# Patient Record
Sex: FEMALE | Race: WHITE | Employment: PART TIME | URBAN - METROPOLITAN AREA
[De-identification: names, ages, dates, MRNs, and addresses within clinical notes are randomized per-mention and may not be internally consistent; named-entity substitution may affect disease eponyms.]

---

## 2017-01-03 ENCOUNTER — HOSPITAL ENCOUNTER (OUTPATIENT)
Dept: CT IMAGING | Age: 57
Discharge: HOME | End: 2017-01-03
Attending: INTERNAL MEDICINE
Payer: SELF-PAY

## 2017-01-03 DIAGNOSIS — E78.00 HYPERCHOLESTEREMIA: ICD-10-CM

## 2017-01-03 PROCEDURE — 75571 CT HRT W/O DYE W/CA TEST: CPT

## 2017-01-03 NOTE — CARDIO/PULMONARY
Cardiac Rehab: Attempted to contact pt about calcium scoring results. Left message on home & cell phone voice mails. Encouraged pt to check results on My Chart or to contact PCP office. Sent copy of report to pt.

## 2017-03-17 ENCOUNTER — TELEPHONE (OUTPATIENT)
Dept: INTERNAL MEDICINE CLINIC | Age: 57
End: 2017-03-17

## 2017-03-17 DIAGNOSIS — F32.A DEPRESSION, UNSPECIFIED DEPRESSION TYPE: ICD-10-CM

## 2017-03-17 NOTE — TELEPHONE ENCOUNTER
----- Message from Lupe Calero sent at 3/17/2017 12:55 PM EDT -----  Regarding: Dr Meyers/rx refill  Pt (p) [phone], pt will  Run out of her medication before her scheduled  CPE on 4/11/17, and she would like to know if she can have her rx  For   Lipitor and her Wellbutrin called into her  [facility] [address] the same one listed in her chart, to hold her until then, or can she be scheduled in sometime next week for her med check?

## 2017-03-20 RX ORDER — BUPROPION HYDROCHLORIDE 100 MG/1
TABLET, EXTENDED RELEASE ORAL
Qty: 30 TAB | Refills: 0 | Status: SHIPPED | OUTPATIENT
Start: 2017-03-20 | End: 2017-04-11 | Stop reason: DRUGHIGH

## 2017-03-20 RX ORDER — BUPROPION HYDROCHLORIDE 300 MG/1
TABLET ORAL
Qty: 30 TAB | Refills: 0 | Status: SHIPPED | OUTPATIENT
Start: 2017-03-20 | End: 2017-04-11 | Stop reason: SDUPTHER

## 2017-03-20 NOTE — TELEPHONE ENCOUNTER
Notified patient we sent a 30 day supply of her wellbutrin to her pharmacy on file. Advised she needs to check with her pharmacy on the Lipitor as this was sent in December for 90 day plus 2 additional refills. Patient voiced understanding.

## 2017-04-11 ENCOUNTER — OFFICE VISIT (OUTPATIENT)
Dept: INTERNAL MEDICINE CLINIC | Age: 57
End: 2017-04-11

## 2017-04-11 ENCOUNTER — HOSPITAL ENCOUNTER (OUTPATIENT)
Dept: GENERAL RADIOLOGY | Age: 57
Discharge: HOME | End: 2017-04-11
Attending: INTERNAL MEDICINE
Payer: COMMERCIAL

## 2017-04-11 VITALS
DIASTOLIC BLOOD PRESSURE: 80 MMHG | RESPIRATION RATE: 16 BRPM | WEIGHT: 128 LBS | HEART RATE: 81 BPM | SYSTOLIC BLOOD PRESSURE: 118 MMHG | HEIGHT: 62 IN | OXYGEN SATURATION: 99 % | TEMPERATURE: 98.8 F | BODY MASS INDEX: 23.55 KG/M2

## 2017-04-11 DIAGNOSIS — M54.2 NECK PAIN: ICD-10-CM

## 2017-04-11 DIAGNOSIS — D12.4 ADENOMATOUS POLYP OF DESCENDING COLON: ICD-10-CM

## 2017-04-11 DIAGNOSIS — Z01.419 WELL WOMAN EXAM WITH ROUTINE GYNECOLOGICAL EXAM: Primary | ICD-10-CM

## 2017-04-11 DIAGNOSIS — Z23 ENCOUNTER FOR IMMUNIZATION: ICD-10-CM

## 2017-04-11 DIAGNOSIS — E78.00 HYPERCHOLESTEREMIA: ICD-10-CM

## 2017-04-11 DIAGNOSIS — F32.A DEPRESSION, UNSPECIFIED DEPRESSION TYPE: ICD-10-CM

## 2017-04-11 PROCEDURE — 72050 X-RAY EXAM NECK SPINE 4/5VWS: CPT

## 2017-04-11 RX ORDER — ATORVASTATIN CALCIUM 40 MG/1
40 TABLET, FILM COATED ORAL DAILY
Qty: 90 TAB | Refills: 3 | Status: SHIPPED | OUTPATIENT
Start: 2017-04-11 | End: 2019-02-12 | Stop reason: SDUPTHER

## 2017-04-11 RX ORDER — BUPROPION HYDROCHLORIDE 300 MG/1
TABLET ORAL
Qty: 90 TAB | Refills: 1 | Status: SHIPPED | OUTPATIENT
Start: 2017-04-11 | End: 2017-08-28 | Stop reason: SDUPTHER

## 2017-04-11 NOTE — MR AVS SNAPSHOT
Visit Information Date & Time Provider Department Dept. Phone Encounter #  
 4/11/2017  8:30 AM Hawa Olivier MD [facility] [address] [id number] Upcoming Health Maintenance Date Due Hepatitis C Screening 1960 DTaP/Tdap/Td series (1 - Tdap) 1/2/2005 BREAST CANCER SCRN MAMMOGRAM 1/14/2017 PAP AKA CERVICAL CYTOLOGY 1/14/2018 COLONOSCOPY 8/17/2022 Allergies as of 4/11/2017  Review Complete On: 4/11/2017 By: Hawa Olivier MD  
 No Known Allergies Current Immunizations  Reviewed on 4/11/2017 Name Date Hepatitis A Vaccine 3/1/2007 Hepatitis B Vaccine 2/4/2009, 3/1/2007 Influenza Vaccine 10/30/2013 Influenza Vaccine (Quad) PF 11/14/2016 Influenza Vaccine Split 10/31/2012 11:51 AM, 10/7/2010  4:02 PM  
 Influenza Vaccine Whole 10/1/2008 Pneumococcal Vaccine (Unspecified Type) 10/1/2007 TD Vaccine 1/1/2005 Tdap  Incomplete Reviewed by Hawa Olivier MD on 4/11/2017 at  8:53 AM  
You Were Diagnosed With   
  
 Codes Comments Well woman exam with routine gynecological exam    -  Primary ICD-10-CM: L88.119 ICD-9-CM: V72.31 Adenomatous polyp of descending colon     ICD-10-CM: D12.4 ICD-9-CM: 211.3 Hypercholesteremia     ICD-10-CM: E78.00 ICD-9-CM: 272.0 Encounter for immunization     ICD-10-CM: I49 ICD-9-CM: V03.89 Depression, unspecified depression type     ICD-10-CM: F32.9 ICD-9-CM: 127 Vitals BP Pulse Temp Resp Height(growth percentile) Weight(growth percentile) 118/80 (BP 1 Location: Left arm, BP Patient Position: Sitting) 81 98.8 °F (37.1 °C) (Oral) 16 5' 1.5\" (1.562 m) 128 lb (58.1 kg) LMP SpO2 BMI OB Status Smoking Status 10/30/2011 99% 23.79 kg/m2 Postmenopausal Never Smoker Vitals History BMI and BSA Data Body Mass Index Body Surface Area  
 23.79 kg/m 2 1.59 m 2 Preferred Pharmacy Pharmacy Name Phone [address] [phone] Your Updated Medication List  
  
   
This list is accurate as of: 4/11/17  9:17 AM.  Always use your most recent med list.  
  
  
  
  
 atorvastatin 40 mg tablet Commonly known as:  LIPITOR Take 1 Tab by mouth daily. buPROPion  mg XL tablet Commonly known as:  WELLBUTRIN XL  
TAKE ONE TABLET BY MOUTH EVERY MORNING  
  
 clonazePAM 0.5 mg tablet Commonly known as:  KlonoPIN  
TAKE ONE TO TWO TABLETS BY MOUTH EVERY EVENING  
  
 CO Q-10 PO Take  by mouth. MULTIPLE VITAMINS PO Take  by mouth. olopatadine 0.1 % ophthalmic solution Commonly known as:  PATANOL Administer 2 Drops to both eyes two (2) times a day. zolpidem 10 mg tablet Commonly known as:  AMBIEN  
TAKE ONE TABLET BY MOUTH NIGHTLY AS NEEDED FOR SLEEP ZyrTEC 10 mg tablet Generic drug:  cetirizine  
take 10 mg by mouth daily. Prescriptions Printed Refills  
 atorvastatin (LIPITOR) 40 mg tablet 3 Sig: Take 1 Tab by mouth daily. Class: Print Route: Oral  
  
Prescriptions Sent to Pharmacy Refills buPROPion XL (WELLBUTRIN XL) 300 mg XL tablet 1 Sig: TAKE ONE TABLET BY MOUTH EVERY MORNING Class: Normal  
 Pharmacy: [address] #: [phone] We Performed the Following HEPATITIS C AB [09900 CPT(R)] TETANUS, DIPHTHERIA TOXOIDS AND ACELLULAR PERTUSSIS VACCINE (TDAP), IN INDIVIDS. >=7, IM [id number] CPT(R)] VITAMIN D, 25 HYDROXY [id number] CPT(R)] To-Do List   
 04/11/2017 Lab:  HIV 1/2 AB SCREEN W RFLX CONFIRM Introducing [facility]! Dear Nadya : Thank you for requesting a [employer] account. Our records indicate that you already have an active [employer] account. You can access your account anytime at [email or web address] Did you know that you can access your hospital and ER discharge instructions at any time in [email or web address]? You can also review all of your test results from your hospital stay or ER visit. Additional Information If you have questions, please visit the Frequently Asked Questions section of the [email or web address] website at [email or web address] Remember, [email or web address] is NOT to be used for urgent needs. For medical emergencies, dial 911. Now available from your iPhone and Android! Please provide this summary of care documentation to your next provider. Your primary care clinician is listed as Raya 68. If you have any questions after today's visit, please call [phone].

## 2017-04-12 NOTE — PROGRESS NOTES
Subjective:   55 y.o. female for [employer] Woman Check. Patient's last menstrual period was 10/30/2011. Social History: not sexually active. Pertinent past medical hstory: due for colonoscopy 7/17-.she is aware  Lost weight in last 3 mo intentionally and feels great-no gerd    Patient Active Problem List    Diagnosis Date Noted    Colon polyp 12/21/2016    Hypercholesteremia 03/17/2010    Depressive disorder, not elsewhere classified 03/18/2009    Low back pain 01/15/2009     Current Outpatient Prescriptions   Medication Sig Dispense Refill    atorvastatin (LIPITOR) 40 mg tablet Take 1 Tab by mouth daily. 90 Tab 3    buPROPion XL (WELLBUTRIN XL) 300 mg XL tablet TAKE ONE TABLET BY MOUTH EVERY MORNING 90 Tab 1    clonazePAM (KLONOPIN) 0.5 mg tablet TAKE ONE TO TWO TABLETS BY MOUTH EVERY EVENING 60 Tab 0    zolpidem (AMBIEN) 10 mg tablet TAKE ONE TABLET BY MOUTH NIGHTLY AS NEEDED FOR SLEEP 20 Tab 1    olopatadine (PATANOL) 0.1 % ophthalmic solution Administer 2 Drops to both eyes two (2) times a day. 15 mL 1    UBIDECARENONE (CO Q-10 PO) Take  by mouth.  MULTIVITAMINS (MULTIPLE VITAMINS PO) Take  by mouth.  ZYRTEC 10 mg Tab take 10 mg by mouth daily. No Known Allergies  Past Medical History:   Diagnosis Date    Colon polyp 12/21/2016 7/12 adenomatous polyp  5 year repeat dr cristóbal Escobar Depressive disorder, not elsewhere classified 3/18/2009    Hypercholesteremia 3/17/2010    Hyperlipidemia LDL goal < 130     Low back pain 1/15/2009    Tubular adenoma polyp of rectum 07/25/2012     Past Surgical History:   Procedure Laterality Date    ENDOSCOPY, COLON, DIAGNOSTIC  7/12    polyps removed dr ambrocio,  3 year repeat     Family History   Problem Relation Age of Onset    Hypertension Father     Heart Failure Father     Stroke Father      Social History   Substance Use Topics    Smoking status: Never Smoker    Smokeless tobacco: Never Used    Alcohol use No        ROS:  Feeling well.  No dyspnea or chest pain on exertion. No abdominal pain, change in bowel habits, black or bloody stools. No urinary tract symptoms. GYN ROS: no breast pain or new or enlarging lumps on self exam, no vaginal bleeding, no discharge or pelvic pain. No neurological complaints. Objective:     Visit Vitals    /80 (BP 1 Location: Left arm, BP Patient Position: Sitting)    Pulse 81    Temp 98.8 °F (37.1 °C) (Oral)    Resp 16    Ht 5' 1.5\" (1.562 m)    Wt 128 lb (58.1 kg)    LMP 10/30/2011    SpO2 99%    BMI 23.79 kg/m2     The patient appears well, alert, oriented x 3, in no distress. ENT normal.  Neck supple tender midcervical spine. No adenopathy or thyromegaly. KHLOE. Lungs are clear, good air entry, no wheezes, rhonchi or rales. S1 and S2 normal, no murmurs, regular rate and rhythm. Abdomen soft without tenderness, guarding, mass or organomegaly. Extremities show no edema, normal peripheral pulses. Neurological is normal, no focal findings. BREAST EXAM: breasts appear normal, no suspicious masses, no skin or nipple changes or axillary nodes    PELVIC EXAM: normal external genitalia, vulva, vagina, cervix, uterus and adnexa, PAP: Pap smear done today, thin-prep method    Assessment/Plan:   well woman  mammogram  pap smear  Donny Sparks was seen today for complete physical and labs. Diagnoses and all orders for this visit:    Well woman exam with routine gynecological exam  -       -     HEPATITIS C AB  -     VITAMIN D, 25 HYDROXY  -     HIV 1/2 AG/AB, 4TH GENERATION,W RFLX CONFIRM    Adenomatous polyp of descending colon-refer to gi this summer for repeat study    Hypercholesteremia-given weight loss dec from 80 mg dose  -     atorvastatin (LIPITOR) 40 mg tablet; Take 1 Tab by mouth daily.     Encounter for immunization  -     Tetanus, diphtheria toxoids and acellular pertussis (TDAP) vaccine, in individuals >=7 years, IM    Depression, unspecified depression type  -     buPROPion XL (WELLBUTRIN XL) 300 mg XL tablet; TAKE ONE TABLET BY MOUTH EVERY MORNING    Neck pain  -     XR SPINE CERV 4 OR 5 V; Future    .

## 2017-04-13 ENCOUNTER — TELEPHONE (OUTPATIENT)
Dept: INTERNAL MEDICINE CLINIC | Age: 57
End: 2017-04-13

## 2017-04-13 NOTE — TELEPHONE ENCOUNTER
Received phone call from [facility] requesting an order for PAP performed on 4/11/17. Please fax to : [phone].

## 2017-04-15 LAB
CYTOLOGIST CVX/VAG CYTO: NORMAL
CYTOLOGY CVX/VAG DOC THIN PREP: NORMAL
DX ICD CODE: NORMAL
HPV I/H RISK 4 DNA CVX QL PROBE+SIG AMP: NEGATIVE
Lab: NORMAL
OTHER STN SPEC: NORMAL
PATH REPORT.FINAL DX SPEC: NORMAL
STAT OF ADQ CVX/VAG CYTO-IMP: NORMAL

## 2017-04-17 DIAGNOSIS — F32.A DEPRESSION, UNSPECIFIED DEPRESSION TYPE: ICD-10-CM

## 2017-04-17 RX ORDER — BUPROPION HYDROCHLORIDE 100 MG/1
TABLET, EXTENDED RELEASE ORAL
Qty: 30 TAB | Refills: 6 | Status: SHIPPED | OUTPATIENT
Start: 2017-04-17 | End: 2017-10-31 | Stop reason: DRUGHIGH

## 2017-08-08 DIAGNOSIS — F32.A DEPRESSION, UNSPECIFIED DEPRESSION TYPE: ICD-10-CM

## 2017-08-08 DIAGNOSIS — F51.01 PRIMARY INSOMNIA: ICD-10-CM

## 2017-08-08 RX ORDER — ZOLPIDEM TARTRATE 10 MG/1
TABLET ORAL
Qty: 20 TAB | Refills: 0 | OUTPATIENT
Start: 2017-08-08 | End: 2018-01-02 | Stop reason: SDUPTHER

## 2017-08-08 RX ORDER — CLONAZEPAM 0.5 MG/1
TABLET ORAL
Qty: 60 TAB | Refills: 0 | OUTPATIENT
Start: 2017-08-08 | End: 2017-08-28 | Stop reason: SDUPTHER

## 2017-08-28 ENCOUNTER — OFFICE VISIT (OUTPATIENT)
Dept: INTERNAL MEDICINE CLINIC | Age: 57
End: 2017-08-28

## 2017-08-28 ENCOUNTER — HOSPITAL ENCOUNTER (OUTPATIENT)
Dept: MAMMOGRAPHY | Age: 57
Discharge: HOME | End: 2017-08-28
Attending: INTERNAL MEDICINE
Payer: COMMERCIAL

## 2017-08-28 VITALS
HEART RATE: 84 BPM | OXYGEN SATURATION: 96 % | HEIGHT: 62 IN | WEIGHT: 134 LBS | TEMPERATURE: 97.5 F | RESPIRATION RATE: 16 BRPM | SYSTOLIC BLOOD PRESSURE: 115 MMHG | DIASTOLIC BLOOD PRESSURE: 68 MMHG | BODY MASS INDEX: 24.66 KG/M2

## 2017-08-28 DIAGNOSIS — F41.0 PANIC ATTACK: ICD-10-CM

## 2017-08-28 DIAGNOSIS — M19.041 ARTHRITIS OF BOTH HANDS: Primary | ICD-10-CM

## 2017-08-28 DIAGNOSIS — Z12.39 BREAST SCREENING: ICD-10-CM

## 2017-08-28 DIAGNOSIS — M19.042 ARTHRITIS OF BOTH HANDS: Primary | ICD-10-CM

## 2017-08-28 DIAGNOSIS — F32.A DEPRESSION, UNSPECIFIED DEPRESSION TYPE: ICD-10-CM

## 2017-08-28 PROCEDURE — 77063 BREAST TOMOSYNTHESIS BI: CPT

## 2017-08-28 PROCEDURE — 77067 SCR MAMMO BI INCL CAD: CPT

## 2017-08-28 RX ORDER — DICLOFENAC SODIUM 10 MG/G
GEL TOPICAL 4 TIMES DAILY
Qty: 100 G | Refills: 11 | Status: SHIPPED | OUTPATIENT
Start: 2017-08-28 | End: 2017-10-31 | Stop reason: ALTCHOICE

## 2017-08-28 RX ORDER — CLONAZEPAM 0.5 MG/1
TABLET ORAL
Qty: 60 TAB | Refills: 0 | Status: SHIPPED | OUTPATIENT
Start: 2017-08-28 | End: 2018-01-02 | Stop reason: SDUPTHER

## 2017-08-28 RX ORDER — BUPROPION HYDROCHLORIDE 300 MG/1
TABLET ORAL
Qty: 90 TAB | Refills: 1 | Status: SHIPPED | OUTPATIENT
Start: 2017-08-28 | End: 2018-01-02 | Stop reason: SDUPTHER

## 2017-08-28 NOTE — MR AVS SNAPSHOT
Visit Information Date & Time Provider Department Dept. Phone Encounter #  
 8/28/2017  7:30 AM Gerson Dean MD [facility] [address] [id number] Upcoming Health Maintenance Date Due Hepatitis C Screening 1960 BREAST CANCER SCRN MAMMOGRAM 1/14/2017 INFLUENZA AGE 9 TO ADULT 8/1/2017 PAP AKA CERVICAL CYTOLOGY 4/11/2020 COLONOSCOPY 8/17/2022 DTaP/Tdap/Td series (2 - Td) 4/11/2027 Allergies as of 8/28/2017  Review Complete On: 8/28/2017 By: Zahra Zapien LPN No Known Allergies Current Immunizations  Reviewed on 4/11/2017 Name Date Hepatitis A Vaccine 3/1/2007 Hepatitis B Vaccine 2/4/2009, 3/1/2007 Influenza Vaccine 10/30/2013 Influenza Vaccine (Quad) PF 11/14/2016 Influenza Vaccine Split 10/31/2012 11:51 AM, 10/7/2010  4:02 PM  
 Influenza Vaccine Whole 10/1/2008 TD Vaccine 1/1/2005 Tdap 4/11/2017 ZZZ-RETIRED (DO NOT USE) Pneumococcal Vaccine (Unspecified Type) 10/1/2007 Not reviewed this visit You Were Diagnosed With   
  
 Codes Comments Arthritis of both hands    -  Primary ICD-10-CM: M19.041, E55.830 ICD-9-CM: 716.94 Depression, unspecified depression type     ICD-10-CM: F32.9 ICD-9-CM: 308 Panic attack     ICD-10-CM: F41.0 ICD-9-CM: 300.01 Vitals BP Pulse Temp Resp Height(growth percentile) Weight(growth percentile)  
 115/68 (BP 1 Location: Left arm, BP Patient Position: Sitting) 84 97.5 °F (36.4 °C) (Oral) 16 5' 1.5\" (1.562 m) 134 lb (60.8 kg) LMP SpO2 BMI OB Status Smoking Status 10/30/2011 96% 24.91 kg/m2 Postmenopausal Never Smoker Vitals History BMI and BSA Data Body Mass Index Body Surface Area 24.91 kg/m 2 1.62 m 2 Preferred Pharmacy Pharmacy Name Phone [facility] [address] [phone] Your Updated Medication List  
  
   
 This list is accurate as of: 8/28/17  8:12 AM.  Always use your most recent med list.  
  
  
  
  
 atorvastatin 40 mg tablet Commonly known as:  LIPITOR Take 1 Tab by mouth daily. * buPROPion  mg SR tablet Commonly known as:  WELLBUTRIN SR  
TAKE ONE TABLET BY MOUTH DAILY. TAKE WITH 300MG FOR A TOTAL OF 400MG * buPROPion  mg XL tablet Commonly known as:  WELLBUTRIN XL  
TAKE ONE TABLET BY MOUTH EVERY MORNING  
  
 clonazePAM 0.5 mg tablet Commonly known as:  KlonoPIN  
TAKE ONE TO TWO TABLETS BY MOUTH EVERY EVENING  
  
 CO Q-10 PO Take  by mouth. diclofenac 1 % Gel Commonly known as:  VOLTAREN Apply  to affected area four (4) times daily. MULTIPLE VITAMINS PO Take  by mouth. olopatadine 0.1 % ophthalmic solution Commonly known as:  PATANOL Administer 2 Drops to both eyes two (2) times a day. zolpidem 10 mg tablet Commonly known as:  AMBIEN  
TAKE ONE TABLET BY MOUTH NIGHTLY AS NEEDED FOR SLEEP ZyrTEC 10 mg tablet Generic drug:  cetirizine  
take 10 mg by mouth daily. * Notice: This list has 2 medication(s) that are the same as other medications prescribed for you. Read the directions carefully, and ask your doctor or other care provider to review them with you. Prescriptions Printed Refills  
 clonazePAM (KLONOPIN) 0.5 mg tablet 0 Sig: TAKE ONE TO TWO TABLETS BY MOUTH EVERY EVENING Class: Print Prescriptions Sent to Pharmacy Refills  
 diclofenac (VOLTAREN) 1 % gel 11 Sig: Apply  to affected area four (4) times daily. Class: Normal  
 Pharmacy: [address] Ph #: [phone] Route: Topical  
 buPROPion XL (WELLBUTRIN XL) 300 mg XL tablet 1 Sig: TAKE ONE TABLET BY MOUTH EVERY MORNING Class: Normal  
 Pharmacy: [address] Ph #: [phone] [facility]! Dear Cleve Benjamin: Thank you for requesting a [employer] account. Our records indicate that you already have an active Watch[employer] account. You can access your account anytime at [email or web address] Did you know that you can access your hospital and ER discharge instructions at any time in Watchup? You can also review all of your test results from your hospital stay or ER visit. Additional Information If you have questions, please visit the Frequently Asked Questions section of the Watchup website at [email or web address] Remember, Watchup is NOT to be used for urgent needs. For medical emergencies, dial 911. Now available from your iPhone and Android! Please provide this summary of care documentation to your next provider. Your primary care clinician is listed as Raya Rodriguez. If you have any questions after today's visit, please call [phone].

## 2017-08-28 NOTE — PROGRESS NOTES
Chief Complaint   Patient presents with    Medication Evaluation    Anxiety     1. Have you been to the ER, urgent care clinic since your last visit? Hospitalized since your last visit? No    2. Have you seen or consulted any other health care providers outside of the [address] [place] [address] since your last visit? Include any pap smears or colon screening. No    Advanced Care Directive is not on file.  Information added to AVS.

## 2017-08-28 NOTE — PROGRESS NOTES
HISTORY OF PRESENT ILLNESS  Martita Perry is a 55 y.o. female. HPI  Follow up med check. Unfortunately had to put her dog down recently. Some sadness and having more pain. She's on Lipitor 40. No myalgias, no cardiac symptoms. She uses Klonopin 0.5 mg t.i.d. prn.  60 tablets filled in February lasted until August.  Ambien 10 mg, 20 tablets, filled in February and lasted till August.   is reviewed and appropriate. She remains on the Wellbutrin and seems to be functioning well, however having more anxiety and may be seeing a therapist this afternoon. Review of Systems   Constitutional: Negative for chills, fever and weight loss. Respiratory: Negative for cough, shortness of breath and wheezing. Cardiovascular: Negative for chest pain, palpitations, orthopnea, leg swelling and PND. Gastrointestinal: Negative for abdominal pain, heartburn and nausea. Musculoskeletal: Positive for joint pain. Negative for myalgias. Neurological: Negative for dizziness and headaches. Psychiatric/Behavioral: Negative for depression and substance abuse. The patient is nervous/anxious and has insomnia. Physical Exam   Constitutional: She is oriented to person, place, and time. She appears well-developed and well-nourished. HENT:   Head: Normocephalic and atraumatic. Neck: Normal range of motion. Neck supple. Carotid bruit is not present. No thyromegaly present. Cardiovascular: Normal rate, regular rhythm, S1 normal, S2 normal, normal heart sounds and intact distal pulses. No murmur heard. Pulmonary/Chest: Effort normal and breath sounds normal. No respiratory distress. She has no wheezes. She has no rales. Musculoskeletal: She exhibits no edema. Neurological: She is alert and oriented to person, place, and time. Psychiatric: She has a normal mood and affect. Her behavior is normal.   Nursing note and vitals reviewed. ASSESSMENT and PLAN  Diagnoses and all orders for this visit:    1. Arthritis of both hands  -     diclofenac (VOLTAREN) 1 % gel; Apply  to affected area four (4) times daily. 2. Depression, unspecified depression type  -     buPROPion XL (WELLBUTRIN XL) 300 mg XL tablet; TAKE ONE TABLET BY MOUTH EVERY MORNING    3. Panic attack  -     clonazePAM (KLONOPIN) 0.5 mg tablet; TAKE ONE TO TWO TABLETS BY MOUTH EVERY EVENING    4. Breast screening  -     [facility] 3D MEL W MAMMO BI SCREENING INCL CAD; Future    appt in 4 moThis note will not be viewable in MyChart.

## 2017-10-31 ENCOUNTER — OFFICE VISIT (OUTPATIENT)
Dept: INTERNAL MEDICINE CLINIC | Age: 57
End: 2017-10-31

## 2017-10-31 ENCOUNTER — HOSPITAL ENCOUNTER (OUTPATIENT)
Dept: GENERAL RADIOLOGY | Age: 57
Discharge: HOME | End: 2017-10-31
Attending: INTERNAL MEDICINE
Payer: COMMERCIAL

## 2017-10-31 VITALS
TEMPERATURE: 98.5 F | SYSTOLIC BLOOD PRESSURE: 137 MMHG | BODY MASS INDEX: 26.31 KG/M2 | HEART RATE: 80 BPM | HEIGHT: 62 IN | DIASTOLIC BLOOD PRESSURE: 98 MMHG | RESPIRATION RATE: 18 BRPM | OXYGEN SATURATION: 99 % | WEIGHT: 143 LBS

## 2017-10-31 DIAGNOSIS — M25.551 RIGHT HIP PAIN: ICD-10-CM

## 2017-10-31 DIAGNOSIS — M70.61 TROCHANTERIC BURSITIS OF RIGHT HIP: ICD-10-CM

## 2017-10-31 DIAGNOSIS — M70.61 TROCHANTERIC BURSITIS OF RIGHT HIP: Primary | ICD-10-CM

## 2017-10-31 PROCEDURE — 73502 X-RAY EXAM HIP UNI 2-3 VIEWS: CPT

## 2017-10-31 RX ORDER — DICLOFENAC SODIUM 75 MG/1
75 TABLET, DELAYED RELEASE ORAL 2 TIMES DAILY
Qty: 30 TAB | Refills: 0 | Status: SHIPPED | OUTPATIENT
Start: 2017-10-31 | End: 2018-05-31 | Stop reason: ALTCHOICE

## 2017-10-31 NOTE — PROGRESS NOTES
HISTORY OF PRESENT ILLNESS  Alexsander Arauz is a 65 y.o. female. HPI  Problem visit:  Alexsander Arauz is here for complaint of R acute hip pain sporadically. Problem began 1 week(s) ago. Severity is marked  Character of problem: intermittant episodes severe but improving - 2 locations. persistant lateral hip pain and intermittant medial groin pain  standing makes the problem worse. Rest, ibuprofen, tramadol makes the problem better. Associated symptoms include: no specific injuries. Dancing at concert just prior to medial sharp pain onset   Treatments tried include: Ibuprofen used and some beneficial      ROS    Physical Exam   Musculoskeletal:        Right hip: She exhibits tenderness. She exhibits normal range of motion, no swelling, no crepitus and no deformity. Right knee: No tenderness found. Lumbar back: She exhibits normal range of motion, no tenderness, no edema and no pain. Legs:  Marked tenderness over R GT. Visit Vitals    BP (!) 137/98 (BP 1 Location: Left arm, BP Patient Position: Sitting)    Pulse 80    Temp 98.5 °F (36.9 °C) (Oral)    Resp 18    Ht 5' 1.5\" (1.562 m)    Wt 143 lb (64.9 kg)    LMP 10/30/2011    SpO2 99%    BMI 26.58 kg/m2       ASSESSMENT and PLAN  Diagnoses and all orders for this visit:    1. Trochanteric bursitis of right hip - counseled on stretches. Start voltaren. Consider PT.    -     diclofenac EC (VOLTAREN) 75 mg EC tablet; Take 1 Tab by mouth two (2) times a day. 2. Right hip pain -medial.  Check xray. With good ROM, I doubt osteoarthritis. ? AVN? ? musculo- Ligamentous or inguinal source of pain? Ortho referral if not improving and no clear answer with xray. -     XR HIP RT W OR WO PELV 2-3 VWS; Future  -     diclofenac EC (VOLTAREN) 75 mg EC tablet; Take 1 Tab by mouth two (2) times a day.       Follow-up Disposition: Not on File

## 2017-10-31 NOTE — MR AVS SNAPSHOT
Visit Information Date & Time Provider Department Dept. Phone Encounter #  
 10/31/2017  3:30 PM Sarbjit Escobedo MD [facility] [address]  [id number] Upcoming Health Maintenance Date Due Hepatitis C Screening 1960 INFLUENZA AGE 9 TO ADULT 8/1/2017 BREAST CANCER SCRN MAMMOGRAM 8/28/2019 PAP AKA CERVICAL CYTOLOGY 4/11/2020 COLONOSCOPY 8/17/2022 DTaP/Tdap/Td series (2 - Td) 4/11/2027 Allergies as of 10/31/2017  Review Complete On: 10/31/2017 By: Pauly Butcher LPN No Known Allergies Current Immunizations  Reviewed on 4/11/2017 Name Date Hepatitis A Vaccine 3/1/2007 Hepatitis B Vaccine 2/4/2009, 3/1/2007 Influenza Vaccine 10/30/2013 Influenza Vaccine (Quad) PF 11/14/2016 Influenza Vaccine Split 10/31/2012 11:51 AM, 10/7/2010  4:02 PM  
 Influenza Vaccine Whole 10/1/2008 TD Vaccine 1/1/2005 Tdap 4/11/2017 ZZZ-RETIRED (DO NOT USE) Pneumococcal Vaccine (Unspecified Type) 10/1/2007 Not reviewed this visit You Were Diagnosed With   
  
 Codes Comments Trochanteric bursitis of right hip    -  Primary ICD-10-CM: M70.61 ICD-9-CM: 726.5 Right hip pain     ICD-10-CM: M25.551 ICD-9-CM: 719.45 Vitals BP Pulse Temp Resp Height(growth percentile) Weight(growth percentile) (!) 137/98 (BP 1 Location: Left arm, BP Patient Position: Sitting) 80 98.5 °F (36.9 °C) (Oral) 18 5' 1.5\" (1.562 m) 143 lb (64.9 kg) LMP SpO2 BMI OB Status Smoking Status 10/30/2011 99% 26.58 kg/m2 Postmenopausal Never Smoker Vitals History BMI and BSA Data Body Mass Index Body Surface Area  
 26.58 kg/m 2 1.68 m 2 Preferred Pharmacy Pharmacy Name Phone [facility] [address] , [address] [place] [address] [phone] Your Updated Medication List  
  
   
This list is accurate as of: 10/31/17  4:10 PM.  Always use your most recent med list.  
  
  
  
  
 atorvastatin 40 mg tablet Commonly known as:  LIPITOR Take 1 Tab by mouth daily. buPROPion  mg XL tablet Commonly known as:  WELLBUTRIN XL  
TAKE ONE TABLET BY MOUTH EVERY MORNING  
  
 clonazePAM 0.5 mg tablet Commonly known as:  KlonoPIN  
TAKE ONE TO TWO TABLETS BY MOUTH EVERY EVENING  
  
 CO Q-10 PO Take  by mouth. diclofenac EC 75 mg EC tablet Commonly known as:  VOLTAREN Take 1 Tab by mouth two (2) times a day. MULTIPLE VITAMINS PO Take  by mouth. olopatadine 0.1 % ophthalmic solution Commonly known as:  PATANOL Administer 2 Drops to both eyes two (2) times a day. zolpidem 10 mg tablet Commonly known as:  AMBIEN  
TAKE ONE TABLET BY MOUTH NIGHTLY AS NEEDED FOR SLEEP ZyrTEC 10 mg tablet Generic drug:  cetirizine  
take 10 mg by mouth daily. Prescriptions Sent to Pharmacy Refills  
 diclofenac EC (VOLTAREN) 75 mg EC tablet 0 Sig: Take 1 Tab by mouth two (2) times a day. Class: Normal  
 Pharmacy: [address] #: [phone] Route: Oral  
  
To-Do List   
 10/31/2017 Imaging:  XR HIP RT W OR WO PELV 2-3 VWS Patient Instructions Trochanteric Bursitis: Exercises Your Care Instructions Here are some examples of typical rehabilitation exercises for your condition. Start each exercise slowly. Ease off the exercise if you start to have pain. Your doctor or physical therapist will tell you when you can start these exercises and which ones will work best for you. How to do the exercises Hamstring wall stretch 1. Lie on your back in a doorway, with your good leg through the open door. 2. Slide your affected leg up the wall to straighten your knee. You should feel a gentle stretch down the back of your leg. 1. Do not arch your back. 2. Do not bend either knee. 3. Keep one heel touching the floor and the other heel touching the wall. Do not point your toes. 3. Hold the stretch for at least 1 minute to begin. Then try to lengthen the time you hold the stretch to as long as 6 minutes. 4. Repeat 2 to 4 times. 5. If you do not have a place to do this exercise in a doorway, there is another way to do it: 6. Lie on your back, and bend the knee of your affected leg. 7. Loop a towel under the ball and toes of that foot, and hold the ends of the towel in your hands. 8. Straighten your knee, and slowly pull back on the towel. You should feel a gentle stretch down the back of your leg. 9. Hold the stretch for 15 to 30 seconds. Or even better, hold the stretch for 1 minute if you can. 10. Repeat 2 to 4 times. Straight-leg raises to the outside 1. Lie on your side, with your affected leg on top. 2. Tighten the front thigh muscles of your top leg to keep your knee straight. 3. Keep your hip and your leg straight in line with the rest of your body, and keep your knee pointing forward. Do not drop your hip back. 4. Lift your top leg straight up toward the ceiling, about 12 inches off the floor. Hold for about 6 seconds, then slowly lower your leg. 5. Repeat 8 to 12 times. Clamshell 1. Lie on your side, with your affected leg on top and your head propped on a pillow. Keep your feet and knees together and your knees bent. 2. Raise your top knee, but keep your feet together. Do not let your hips roll back. Your legs should open up like a clamshell. 3. Hold for 6 seconds. 4. Slowly lower your knee back down. Rest for 10 seconds. 5. Repeat 8 to 12 times. Standing quadriceps stretch 1. If you are not steady on your feet, hold on to a chair, counter, or wall. You can also lie on your stomach or your side to do this exercise. 2. Bend the knee of the leg you want to stretch, and reach behind you to grab the front of your foot or ankle with the hand on the same side. For example, if you are stretching your right leg, use your right hand. 3. Keeping your knees next to each other, pull your foot toward your buttock until you feel a gentle stretch across the front of your hip and down the front of your thigh. Your knee should be pointed directly to the ground, and not out to the side. 4. Hold the stretch for 15 to 30 seconds. 5. Repeat 2 to 4 times. Piriformis stretch 1. Lie on your back with your legs straight. 2. Lift your affected leg and bend your knee. With your opposite hand, reach across your body, and then gently pull your knee toward your opposite shoulder. 3. Hold the stretch for 15 to 30 seconds. 4. Repeat 2 to 4 times. Double knee-to-chest 
 
1. Lie on your back with your knees bent and your feet flat on the floor. You can put a small pillow under your head and neck if it is more comfortable. 2. Bring both knees to your chest. 
3. Keep your lower back pressed to the floor. Hold for 15 to 30 seconds. 4. Relax, and lower your knees to the starting position. 5. Repeat 2 to 4 times. Follow-up care is a key part of your treatment and safety. Be sure to make and go to all appointments, and call your doctor if you are having problems. It's also a good idea to know your test results and keep a list of the medicines you take. Where can you learn more? Go to http://leanna-newton.info/. Enter I563 in the search box to learn more about \"Trochanteric Bursitis: Exercises. \" Current as of: March 21, 2017 Content Version: 11.4 © 0537-9564 Oyster. Care instructions adapted under license by Omise (which disclaims liability or warranty for this information). If you have questions about a medical condition or this instruction, always ask your healthcare professional. [place] [address] any warranty or liability for your use of this information. Hip Bursitis: Care Instructions Your Care Instructions Bursitis is inflammation of the bursa. A bursa is a small sac of fluid that cushions a joint and helps it move easily. A bursa sits between a bone in the hip and the muscles and tendons in the thigh and buttock. Injury or overuse of the hip can cause bursitis. Activities that can lead to bursitis include twisting and rapid joint movement. Bursitis can cause hip pain. Bursitis usually gets better if you avoid the activity that caused it. If pain lasts or gets worse despite home treatment, your doctor may draw fluid from the bursa through a needle. This may relieve your pain and help your doctor know if you have an infection. If so, your doctor will prescribe antibiotics. If you have inflammation only, you may get a corticosteroid shot to reduce swelling and pain. Sometimes surgery is needed to drain or remove the bursa. Follow-up care is a key part of your treatment and safety. Be sure to make and go to all appointments, and call your doctor if you are having problems. It's also a good idea to know your test results and keep a list of the medicines you take. How can you care for yourself at home? · Put ice or a cold pack on your hip for 10 to 20 minutes at a time. Put a thin cloth between the ice and your skin. · After 3 days of using ice, you may use heat on your hip. You can use a hot water bottle, a heating pad set on low, or a warm, moist towel. · Rest your hip. Stop any activities that cause pain. Switch to activities that do not stress your hip. · Take your medicines exactly as prescribed. Call your doctor if you think you are having a problem with your medicine. · Ask your doctor if you can take an over-the-counter pain medicine, such as acetaminophen (Tylenol), ibuprofen (Advil, Motrin), or naproxen (Aleve). Be safe with medicines. Read and follow all instructions on the label.  
· To prevent stiffness, gently move the hip joint as much as you can without pain every day. As the pain gets better, keep doing range-of-motion exercises. Ask your doctor for exercises that will make the muscles around the hip joint stronger. Do these as directed. · You can slowly return to the activity that caused the pain, but do it with less effort until you can do it without pain or swelling. Be sure to warm up before and stretch after you do the activity. When should you call for help? Call your doctor now or seek immediate medical care if: 
? · You have a fever. ? · You have increased swelling or redness in your hip. ? · You cannot use your hip, or the pain in your hip gets worse. ? Watch closely for changes in your health, and be sure to contact your doctor if: 
? · You have pain for 2 weeks or longer despite home treatment. Where can you learn more? Go to http://leanna-newton.info/. Enter O926 in the search box to learn more about \"Hip Bursitis: Care Instructions. \" Current as of: March 21, 2017 Content Version: 11.4 © 8895-8805 Perk. Care instructions adapted under license by Viverae (which disclaims liability or warranty for this information). If you have questions about a medical condition or this instruction, always ask your healthcare professional. [address] any warranty or liability for your use of this information. Introducing [facility]! Dear Markel Maranda: Thank you for requesting a Epic Sciences account. Our records indicate that you already have an active Epic Sciences account. You can access your account anytime at [email or web address] Did you know that you can access your hospital and ER discharge instructions at any time in Epic Sciences? You can also review all of your test results from your hospital stay or ER visit. Additional Information If you have questions, please visit the Frequently Asked Questions section of the vzaar website at [email or web address] Remember, vzaar is NOT to be used for urgent needs. For medical emergencies, dial 911. Now available from your iPhone and Android! Please provide this summary of care documentation to your next provider. Your primary care clinician is listed as Raya Rodriguez. If you have any questions after today's visit, please call [phone].

## 2017-10-31 NOTE — PATIENT INSTRUCTIONS
Trochanteric Bursitis: Exercises  Your Care Instructions  Here are some examples of typical rehabilitation exercises for your condition. Start each exercise slowly. Ease off the exercise if you start to have pain. Your doctor or physical therapist will tell you when you can start these exercises and which ones will work best for you. How to do the exercises  Hamstring wall stretch    1. Lie on your back in a doorway, with your good leg through the open door. 2. Slide your affected leg up the wall to straighten your knee. You should feel a gentle stretch down the back of your leg. 1. Do not arch your back. 2. Do not bend either knee. 3. Keep one heel touching the floor and the other heel touching the wall. Do not point your toes. 3. Hold the stretch for at least 1 minute to begin. Then try to lengthen the time you hold the stretch to as long as 6 minutes. 4. Repeat 2 to 4 times. 5. If you do not have a place to do this exercise in a doorway, there is another way to do it:  6. Lie on your back, and bend the knee of your affected leg. 7. Loop a towel under the ball and toes of that foot, and hold the ends of the towel in your hands. 8. Straighten your knee, and slowly pull back on the towel. You should feel a gentle stretch down the back of your leg. 9. Hold the stretch for 15 to 30 seconds. Or even better, hold the stretch for 1 minute if you can. 10. Repeat 2 to 4 times. Straight-leg raises to the outside    1. Lie on your side, with your affected leg on top. 2. Tighten the front thigh muscles of your top leg to keep your knee straight. 3. Keep your hip and your leg straight in line with the rest of your body, and keep your knee pointing forward. Do not drop your hip back. 4. Lift your top leg straight up toward the ceiling, about 12 inches off the floor. Hold for about 6 seconds, then slowly lower your leg. 5. Repeat 8 to 12 times. Clamshell    1.  Lie on your side, with your affected leg on top and your head propped on a pillow. Keep your feet and knees together and your knees bent. 2. Raise your top knee, but keep your feet together. Do not let your hips roll back. Your legs should open up like a clamshell. 3. Hold for 6 seconds. 4. Slowly lower your knee back down. Rest for 10 seconds. 5. Repeat 8 to 12 times. Standing quadriceps stretch    1. If you are not steady on your feet, hold on to a chair, counter, or wall. You can also lie on your stomach or your side to do this exercise. 2. Bend the knee of the leg you want to stretch, and reach behind you to grab the front of your foot or ankle with the hand on the same side. For example, if you are stretching your right leg, use your right hand. 3. Keeping your knees next to each other, pull your foot toward your buttock until you feel a gentle stretch across the front of your hip and down the front of your thigh. Your knee should be pointed directly to the ground, and not out to the side. 4. Hold the stretch for 15 to 30 seconds. 5. Repeat 2 to 4 times. Piriformis stretch    1. Lie on your back with your legs straight. 2. Lift your affected leg and bend your knee. With your opposite hand, reach across your body, and then gently pull your knee toward your opposite shoulder. 3. Hold the stretch for 15 to 30 seconds. 4. Repeat 2 to 4 times. Double knee-to-chest    1. Lie on your back with your knees bent and your feet flat on the floor. You can put a small pillow under your head and neck if it is more comfortable. 2. Bring both knees to your chest.  3. Keep your lower back pressed to the floor. Hold for 15 to 30 seconds. 4. Relax, and lower your knees to the starting position. 5. Repeat 2 to 4 times. Follow-up care is a key part of your treatment and safety. Be sure to make and go to all appointments, and call your doctor if you are having problems.  It's also a good idea to know your test results and keep a list of the medicines you take.  Where can you learn more? Go to http://leanna-newton.info/. Enter B994 in the search box to learn more about \"Trochanteric Bursitis: Exercises. \"  Current as of: March 21, 2017  Content Version: 11.4  © 2395-8759 Malesbanget. Care instructions adapted under license by Project Airplane (which disclaims liability or warranty for this information). If you have questions about a medical condition or this instruction, always ask your healthcare professional. [address] any warranty or liability for your use of this information. Hip Bursitis: Care Instructions  Your Care Instructions    Bursitis is inflammation of the bursa. A bursa is a small sac of fluid that cushions a joint and helps it move easily. A bursa sits between a bone in the hip and the muscles and tendons in the thigh and buttock. Injury or overuse of the hip can cause bursitis. Activities that can lead to bursitis include twisting and rapid joint movement. Bursitis can cause hip pain. Bursitis usually gets better if you avoid the activity that caused it. If pain lasts or gets worse despite home treatment, your doctor may draw fluid from the bursa through a needle. This may relieve your pain and help your doctor know if you have an infection. If so, your doctor will prescribe antibiotics. If you have inflammation only, you may get a corticosteroid shot to reduce swelling and pain. Sometimes surgery is needed to drain or remove the bursa. Follow-up care is a key part of your treatment and safety. Be sure to make and go to all appointments, and call your doctor if you are having problems. It's also a good idea to know your test results and keep a list of the medicines you take. How can you care for yourself at home? · Put ice or a cold pack on your hip for 10 to 20 minutes at a time. Put a thin cloth between the ice and your skin.   · After 3 days of using ice, you may use heat on your hip. You can use a hot water bottle, a heating pad set on low, or a warm, moist towel. · Rest your hip. Stop any activities that cause pain. Switch to activities that do not stress your hip. · Take your medicines exactly as prescribed. Call your doctor if you think you are having a problem with your medicine. · Ask your doctor if you can take an over-the-counter pain medicine, such as acetaminophen (Tylenol), ibuprofen (Advil, Motrin), or naproxen (Aleve). Be safe with medicines. Read and follow all instructions on the label. · To prevent stiffness, gently move the hip joint as much as you can without pain every day. As the pain gets better, keep doing range-of-motion exercises. Ask your doctor for exercises that will make the muscles around the hip joint stronger. Do these as directed. · You can slowly return to the activity that caused the pain, but do it with less effort until you can do it without pain or swelling. Be sure to warm up before and stretch after you do the activity. When should you call for help? Call your doctor now or seek immediate medical care if:  ? · You have a fever. ? · You have increased swelling or redness in your hip. ? · You cannot use your hip, or the pain in your hip gets worse. ? Watch closely for changes in your health, and be sure to contact your doctor if:  ? · You have pain for 2 weeks or longer despite home treatment. Where can you learn more? Go to http://leanna-newton.info/. Enter P648 in the search box to learn more about \"Hip Bursitis: Care Instructions. \"  Current as of: March 21, 2017  Content Version: 11.4  © 2363-5347 Affresol. Care instructions adapted under license by about.me (which disclaims liability or warranty for this information).  If you have questions about a medical condition or this instruction, always ask your healthcare professional. Soledad Seay disclaims any warranty or liability for your use of this information.

## 2017-12-14 ENCOUNTER — TELEPHONE (OUTPATIENT)
Dept: INTERNAL MEDICINE CLINIC | Age: 57
End: 2017-12-14

## 2017-12-14 DIAGNOSIS — E78.00 HYPERCHOLESTEREMIA: Primary | ICD-10-CM

## 2017-12-14 DIAGNOSIS — Z13.21 ENCOUNTER FOR VITAMIN DEFICIENCY SCREENING: ICD-10-CM

## 2017-12-14 NOTE — TELEPHONE ENCOUNTER
Verified with patient's pharmacy she has refills on file for her wellbutrin 300 mg & this is her current therapy. Per PCP will place orders at the  for pick-up to complete fasting. V/m left also notifying patient a dose adjustment of her wellbutrin will need to be discussed in office at her upcoming appt.

## 2017-12-14 NOTE — TELEPHONE ENCOUNTER
----- Message from Toma Griffin sent at 12/14/2017  1:43 PM EST -----  Regarding: Dr. Mark Cantu  Pt is requesting to have another Rx for Wellbutrin 100mg called in to [facility] on [address] on file. Pt also would like lab orders for HTN CON/CHOL CON appt on 1/2/18 ready for  ahead of time. Best contact number [phone].

## 2018-01-02 ENCOUNTER — OFFICE VISIT (OUTPATIENT)
Dept: INTERNAL MEDICINE CLINIC | Age: 58
End: 2018-01-02

## 2018-01-02 VITALS
HEART RATE: 84 BPM | SYSTOLIC BLOOD PRESSURE: 141 MMHG | WEIGHT: 144 LBS | HEIGHT: 62 IN | BODY MASS INDEX: 26.5 KG/M2 | RESPIRATION RATE: 16 BRPM | TEMPERATURE: 99.1 F | DIASTOLIC BLOOD PRESSURE: 100 MMHG | OXYGEN SATURATION: 98 %

## 2018-01-02 DIAGNOSIS — F51.01 PRIMARY INSOMNIA: ICD-10-CM

## 2018-01-02 DIAGNOSIS — I10 HTN, GOAL BELOW 130/80: Primary | ICD-10-CM

## 2018-01-02 DIAGNOSIS — F32.A DEPRESSION, UNSPECIFIED DEPRESSION TYPE: ICD-10-CM

## 2018-01-02 DIAGNOSIS — F41.0 PANIC ATTACK: ICD-10-CM

## 2018-01-02 RX ORDER — CLONAZEPAM 0.5 MG/1
TABLET ORAL
Qty: 60 TAB | Refills: 0 | Status: SHIPPED | OUTPATIENT
Start: 2018-01-02 | End: 2019-02-12 | Stop reason: ALTCHOICE

## 2018-01-02 RX ORDER — ZOLPIDEM TARTRATE 10 MG/1
TABLET ORAL
Qty: 20 TAB | Refills: 0 | Status: SHIPPED | OUTPATIENT
Start: 2018-01-02 | End: 2019-02-12 | Stop reason: SDUPTHER

## 2018-01-02 RX ORDER — BUPROPION HYDROCHLORIDE 300 MG/1
TABLET ORAL
Qty: 90 TAB | Refills: 1 | Status: SHIPPED | OUTPATIENT
Start: 2018-01-02 | End: 2019-02-12 | Stop reason: SDUPTHER

## 2018-01-02 RX ORDER — LISINOPRIL 5 MG/1
5 TABLET ORAL DAILY
Qty: 30 TAB | Refills: 3 | Status: SHIPPED | OUTPATIENT
Start: 2018-01-02 | End: 2018-05-14 | Stop reason: SDUPTHER

## 2018-01-02 RX ORDER — BUPROPION HYDROCHLORIDE 100 MG/1
TABLET, EXTENDED RELEASE ORAL
Qty: 30 TAB | Refills: 4 | Status: SHIPPED | OUTPATIENT
Start: 2018-01-02 | End: 2018-09-20 | Stop reason: SDUPTHER

## 2018-01-02 NOTE — MR AVS SNAPSHOT
Visit Information Date & Time Provider Department Dept. Phone Encounter #  
 1/2/2018 11:30 AM Anna Villavicencio MD [facility] [address] [id number] Upcoming Health Maintenance Date Due Hepatitis C Screening 1960 Influenza Age 5 to Adult 8/1/2017 BREAST CANCER SCRN MAMMOGRAM 8/28/2019 PAP AKA CERVICAL CYTOLOGY 4/11/2020 COLONOSCOPY 8/17/2022 DTaP/Tdap/Td series (2 - Td) 4/11/2027 Allergies as of 1/2/2018  Review Complete On: 1/2/2018 By: Anna Villavicencio MD  
 No Known Allergies Current Immunizations  Reviewed on 4/11/2017 Name Date Hepatitis A Vaccine 3/1/2007 Hepatitis B Vaccine 2/4/2009, 3/1/2007 Influenza Vaccine 10/30/2013 Influenza Vaccine (Quad) PF 11/14/2016 Influenza Vaccine Split 10/31/2012 11:51 AM, 10/7/2010  4:02 PM  
 Influenza Vaccine Whole 10/1/2008 TD Vaccine 1/1/2005 Tdap 4/11/2017 ZZZ-RETIRED (DO NOT USE) Pneumococcal Vaccine (Unspecified Type) 10/1/2007 Not reviewed this visit You Were Diagnosed With   
  
 Codes Comments HTN, goal below 130/80    -  Primary ICD-10-CM: I10 
ICD-9-CM: 401.9 Depression, unspecified depression type     ICD-10-CM: F32.9 ICD-9-CM: 491 Primary insomnia     ICD-10-CM: F51.01 
ICD-9-CM: 307.42 Panic attack     ICD-10-CM: F41.0 ICD-9-CM: 300.01 Vitals BP Pulse Temp Resp Height(growth percentile) Weight(growth percentile) (!) 141/100 (BP 1 Location: Right arm, BP Patient Position: Sitting) 84 99.1 °F (37.3 °C) (Oral) 16 5' 1.5\" (1.562 m) 144 lb (65.3 kg) LMP SpO2 BMI OB Status Smoking Status 10/30/2011 98% 26.77 kg/m2 Postmenopausal Never Smoker Vitals History BMI and BSA Data Body Mass Index Body Surface Area  
 26.77 kg/m 2 1.68 m 2 Preferred Pharmacy Pharmacy Name Phone Niya Alvarado [address] [address], [address] [facility], [address] [phone] Your Updated Medication List  
  
 This list is accurate as of: 1/2/18 12:03 PM.  Always use your most recent med list.  
  
  
  
  
 atorvastatin 40 mg tablet Commonly known as:  LIPITOR Take 1 Tab by mouth daily. * buPROPion  mg XL tablet Commonly known as:  WELLBUTRIN XL  
TAKE ONE TABLET BY MOUTH EVERY MORNING  
  
 * buPROPion  mg SR tablet Commonly known as:  [place]osevelt  With 300 mg qd  
  
 clonazePAM 0.5 mg tablet Commonly known as:  KlonoPIN  
TAKE ONE TO TWO TABLETS BY MOUTH EVERY EVENING  
  
 CO Q-10 PO Take  by mouth. diclofenac EC 75 mg EC tablet Commonly known as:  VOLTAREN Take 1 Tab by mouth two (2) times a day. lisinopril 5 mg tablet Commonly known as:  Madonna Cliche Take 1 Tab by mouth daily. MULTIPLE VITAMINS PO Take  by mouth. olopatadine 0.1 % ophthalmic solution Commonly known as:  PATANOL Administer 2 Drops to both eyes two (2) times a day. zolpidem 10 mg tablet Commonly known as:  AMBIEN  
TAKE ONE TABLET BY MOUTH NIGHTLY AS NEEDED FOR SLEEP ZyrTEC 10 mg tablet Generic drug:  cetirizine  
take 10 mg by mouth daily. * Notice: This list has 2 medication(s) that are the same as other medications prescribed for you. Read the directions carefully, and ask your doctor or other care provider to review them with you. Prescriptions Printed Refills  
 zolpidem (AMBIEN) 10 mg tablet 0 Sig: TAKE ONE TABLET BY MOUTH NIGHTLY AS NEEDED FOR SLEEP Class: Print  
 clonazePAM (KLONOPIN) 0.5 mg tablet 0 Sig: TAKE ONE TO TWO TABLETS BY MOUTH EVERY EVENING Class: Print Prescriptions Sent to Pharmacy Refills  
 lisinopril (PRINIVIL, ZESTRIL) 5 mg tablet 3 Sig: Take 1 Tab by mouth daily. Class: Normal  
 Pharmacy: [facility] [address] [place] [address], [address] [place] [address] #: [phone] Route: Oral  
 buPROPion XL (WELLBUTRIN XL) 300 mg XL tablet 1  Sig: TAKE ONE TABLET BY MOUTH EVERY MORNING  
 Class: Normal  
 Pharmacy: [facility] [address] [place], [place] - [address]  Ph #: [phone]  
 buPROPion SR (WELLBUTRIN SR) 100 mg SR tablet 4 Sig: With 300 mg qd Class: Normal  
 Pharmacy: [facility] Velasco [address] [facility], [address] [facility], [address] Ph #: [phone] Introducing [facility]! Dear Scotty Harrison: Thank you for requesting a Sensopia account. Our records indicate that you already have an active Sensopia account. You can access your account anytime at [email or web address] Did you know that you can access your hospital and ER discharge instructions at any time in Sensopia? You can also review all of your test results from your hospital stay or ER visit. Additional Information If you have questions, please visit the Frequently Asked Questions section of the Sensopia website at [email or web address] Remember, Sensopia is NOT to be used for urgent needs. For medical emergencies, dial 911. Now available from your iPhone and Android! Please provide this summary of care documentation to your next provider. Your primary care clinician is listed as Raya Rodriguez. If you have any questions after today's visit, please call [phone].

## 2018-01-02 NOTE — PROGRESS NOTES
HISTORY OF PRESENT ILLNESS  Daniel Latham is a 65 y.o. female. HAZEL Joya's blood pressure is running high and has been intermittently high over the last three months. She does feel a lot of stress with upcoming divorce. She drinks about two cups of espresso a day. She is careful with alcohol. She is using one Voltaren daily for right hip bursitis and found it very helpful. We discussed given family history initiating treatment for hypertension and she is amenable. Mood. She had dropped from 400 to 300 of Wellbutrin, but began to have more depressive symptoms so about a month ago resumed the extra 100 and this has been helpful. She's not had side effects. She remains on Lipitor. No chest pain, no myalgias. Review of Systems   Constitutional: Negative for chills, fever and weight loss. Respiratory: Negative for cough, shortness of breath and wheezing. Cardiovascular: Negative for chest pain, palpitations, orthopnea, leg swelling and PND. Gastrointestinal: Negative for heartburn and nausea. Musculoskeletal: Negative for myalgias. Neurological: Negative for dizziness and headaches. Psychiatric/Behavioral: Negative for depression. The patient is nervous/anxious. Physical Exam   Constitutional: She is oriented to person, place, and time. She appears well-developed and well-nourished. HENT:   Head: Normocephalic and atraumatic. Neck: Normal range of motion. Neck supple. Carotid bruit is not present. No thyromegaly present. Cardiovascular: Normal rate, regular rhythm, S1 normal, S2 normal, normal heart sounds and intact distal pulses. No murmur heard. Pulmonary/Chest: Effort normal and breath sounds normal. No respiratory distress. She has no wheezes. She has no rales. Musculoskeletal: She exhibits no edema. Neurological: She is alert and oriented to person, place, and time. Psychiatric: She has a normal mood and affect.  Her behavior is normal.   Nursing note and vitals reviewed. ASSESSMENT and PLAN  Diagnoses and all orders for this visit:    1. HTN, goal below 130/80  -     lisinopril (PRINIVIL, ZESTRIL) 5 mg tablet; Take 1 Tab by mouth daily. 2. Depression, unspecified depression type  -     buPROPion XL (WELLBUTRIN XL) 300 mg XL tablet; TAKE ONE TABLET BY MOUTH EVERY MORNING  -     buPROPion SR (WELLBUTRIN SR) 100 mg SR tablet; With 300 mg qd    3. Primary insomnia  -     zolpidem (AMBIEN) 10 mg tablet; TAKE ONE TABLET BY MOUTH NIGHTLY AS NEEDED FOR SLEEP    4.  Panic attack  -     clonazePAM (KLONOPIN) 0.5 mg tablet; TAKE ONE TO TWO TABLETS BY MOUTH EVERY EVENING      the following changes in treatment are made: dec caffeine  Start lisinopril  Side effects possible reviewed in detail  appt in 1m o

## 2018-03-13 DIAGNOSIS — E78.00 HYPERCHOLESTEREMIA: ICD-10-CM

## 2018-03-13 RX ORDER — ATORVASTATIN CALCIUM 80 MG/1
TABLET, FILM COATED ORAL
Qty: 30 TAB | Refills: 1 | Status: SHIPPED | OUTPATIENT
Start: 2018-03-13 | End: 2018-05-17 | Stop reason: SDUPTHER

## 2018-05-04 RX ORDER — OLOPATADINE HYDROCHLORIDE 1 MG/ML
2 SOLUTION/ DROPS OPHTHALMIC 2 TIMES DAILY
Qty: 15 ML | Refills: 1 | Status: SHIPPED | OUTPATIENT
Start: 2018-05-04

## 2018-05-14 DIAGNOSIS — I10 HTN, GOAL BELOW 130/80: ICD-10-CM

## 2018-05-14 RX ORDER — LISINOPRIL 5 MG/1
TABLET ORAL
Qty: 90 TAB | Refills: 0 | Status: SHIPPED | OUTPATIENT
Start: 2018-05-14 | End: 2018-08-19 | Stop reason: SDUPTHER

## 2018-05-17 DIAGNOSIS — E78.00 HYPERCHOLESTEREMIA: ICD-10-CM

## 2018-05-17 RX ORDER — ATORVASTATIN CALCIUM 80 MG/1
TABLET, FILM COATED ORAL
Qty: 30 TAB | Refills: 0 | Status: SHIPPED | OUTPATIENT
Start: 2018-05-17 | End: 2018-05-31 | Stop reason: ALTCHOICE

## 2018-05-31 ENCOUNTER — OFFICE VISIT (OUTPATIENT)
Dept: INTERNAL MEDICINE CLINIC | Age: 58
End: 2018-05-31

## 2018-05-31 VITALS
HEART RATE: 98 BPM | HEIGHT: 62 IN | TEMPERATURE: 98.7 F | OXYGEN SATURATION: 99 % | SYSTOLIC BLOOD PRESSURE: 117 MMHG | WEIGHT: 146.4 LBS | BODY MASS INDEX: 26.94 KG/M2 | DIASTOLIC BLOOD PRESSURE: 70 MMHG | RESPIRATION RATE: 12 BRPM

## 2018-05-31 DIAGNOSIS — J02.9 SORE THROAT: Primary | ICD-10-CM

## 2018-05-31 DIAGNOSIS — M79.10 MYALGIA: ICD-10-CM

## 2018-05-31 DIAGNOSIS — R52 BODY ACHES: ICD-10-CM

## 2018-05-31 DIAGNOSIS — J01.00 ACUTE MAXILLARY SINUSITIS, RECURRENCE NOT SPECIFIED: ICD-10-CM

## 2018-05-31 DIAGNOSIS — E78.00 HYPERCHOLESTEREMIA: ICD-10-CM

## 2018-05-31 LAB
S PYO AG THROAT QL: NEGATIVE
VALID INTERNAL CONTROL?: YES

## 2018-05-31 RX ORDER — AZITHROMYCIN 250 MG/1
250 TABLET, FILM COATED ORAL SEE ADMIN INSTRUCTIONS
Qty: 6 TAB | Refills: 0 | Status: SHIPPED | OUTPATIENT
Start: 2018-05-31 | End: 2018-06-05

## 2018-05-31 NOTE — PROGRESS NOTES
HISTORY OF PRESENT ILLNESS  Guru Dixon is a 65 y.o. female. HPI  Upper respiratory illness:  Guru Dixon presents with complaints of congestion, sore throat, post nasal drip, cough described as productive of milky sputum, myalgias, headache, generalized sinus pain, milky nasal discharge and pain while swallowing for 2 weeks. Symptoms began several days after she flew back from [place] and have been persistent since then. no nausea and no vomiting . she has not had  fever and chills. Symptoms are moderate. Over-the-counter remedies including Neti pot   has been used with poor relief of symptoms. Drinking plenty of fluids: yes  Asthma?:  no  non-smoker  Contacts with similar infections: yes     She is having muscle and joint pains since current URI symptoms began; she is also taking Lipitor 40 mg daily and is overdue to have her labs drawn. She had labs ordered in 12/2017 by Dr Jacques Aguilera but has not had these done yet. Review of Systems   Constitutional: Positive for malaise/fatigue. Negative for chills and fever. HENT: Positive for congestion and sore throat. Negative for sinus pain. Respiratory: Positive for cough and sputum production. Negative for shortness of breath. Cardiovascular: Negative for chest pain and palpitations. Gastrointestinal: Negative for nausea and vomiting. Genitourinary: Negative for dysuria and frequency. Musculoskeletal: Positive for myalgias. Negative for back pain. Skin: Negative for rash. Neurological: Positive for headaches. Negative for dizziness and tingling. /70 (BP 1 Location: Left arm, BP Patient Position: Sitting)  Pulse 98  Temp 98.7 °F (37.1 °C) (Oral)   Resp 12  Ht 5' 1.5\" (1.562 m)  Wt 146 lb 6.4 oz (66.4 kg)  LMP 10/30/2011  SpO2 99%  BMI 27.21 kg/m2  Physical Exam   Constitutional: She is oriented to person, place, and time. She appears well-developed and well-nourished. HENT:   Head: Normocephalic and atraumatic. Right Ear: External ear normal.   Left Ear: External ear normal.   Nose: Mucosal edema present. Right sinus exhibits maxillary sinus tenderness and frontal sinus tenderness. Left sinus exhibits maxillary sinus tenderness. Left sinus exhibits no frontal sinus tenderness. Mouth/Throat: Posterior oropharyngeal erythema present. No posterior oropharyngeal edema. Neck: Normal range of motion. Neck supple. No thyromegaly present. Cardiovascular: Normal rate and regular rhythm. Pulmonary/Chest: Effort normal. She has no wheezes. She has no rales. Mild upper chest congestion   Lymphadenopathy:     She has no cervical adenopathy. Neurological: She is alert and oriented to person, place, and time. Skin: Skin is warm and dry. Psychiatric: She has a normal mood and affect. Her behavior is normal.   Nursing note and vitals reviewed. ASSESSMENT and PLAN  Diagnoses and all orders for this visit:    1. Sore throat  -     AMB POC RAPID STREP A -- negative    2. Acute maxillary sinusitis, recurrence not specified -- continue with use of Neti pot and increase fluids  -     azithromycin (ZITHROMAX) 250 mg tablet; Take 1 Tab by mouth See Admin Instructions for 5 days. 3. Myalgia -- may be related to current illness but also concern about effects of statins  -     CK    4. Body aches  -     CK    5.  Hypercholesteremia -- reprinted lab orders from 12/2017      lab results and schedule of future lab studies reviewed with patient  reviewed diet, exercise and weight control  reviewed medications and side effects in detail

## 2018-05-31 NOTE — MR AVS SNAPSHOT
[address] [facility] 
 
 
 [address] 
[phone] Patient: Yocasta Durand MRN: [id number] :1960 Visit Information Date & Time Provider Department Dept. Phone Encounter #  
 2018  1:20 PM Chirag Ferrera NP [facility] [address] [id number] Upcoming Health Maintenance Date Due Hepatitis C Screening 1960 Influenza Age 5 to Adult 2018 BREAST CANCER SCRN MAMMOGRAM 2019 PAP AKA CERVICAL CYTOLOGY 2020 COLONOSCOPY 2022 DTaP/Tdap/Td series (2 - Td) 2027 Allergies as of 2018  Review Complete On: 2018 By: Chirag Ferrera NP No Known Allergies Current Immunizations  Reviewed on 2017 Name Date Hepatitis A Vaccine 3/1/2007 Hepatitis B Vaccine 2009, 3/1/2007 Influenza Vaccine 10/30/2013 Influenza Vaccine (Quad) PF 2016 Influenza Vaccine Split 10/31/2012 11:51 AM, 10/7/2010  4:02 PM  
 Influenza Vaccine Whole 10/1/2008 TD Vaccine 2005 Tdap 2017 ZZZ-RETIRED (DO NOT USE) Pneumococcal Vaccine (Unspecified Type) 10/1/2007 Not reviewed this visit You Were Diagnosed With   
  
 Codes Comments Myalgia    -  Primary ICD-10-CM: M79.1 ICD-9-CM: 729.1 Sore throat     ICD-10-CM: J02.9 ICD-9-CM: 854 Acute maxillary sinusitis, recurrence not specified     ICD-10-CM: J01.00 ICD-9-CM: 461.0 Vitals BP Pulse Temp Resp Height(growth percentile) Weight(growth percentile) 117/70 (BP 1 Location: Left arm, BP Patient Position: Sitting) 98 98.7 °F (37.1 °C) (Oral) 12 5' 1.5\" (1.562 m) 146 lb 6.4 oz (66.4 kg) LMP SpO2 BMI OB Status Smoking Status 10/30/2011 99% 27.21 kg/m2 Postmenopausal Never Smoker Vitals History BMI and BSA Data Body Mass Index Body Surface Area  
 27.21 kg/m 2 1.7 m 2 Preferred Pharmacy Pharmacy Name Phone [address] [phone] Your Updated Medication List  
  
   
This list is accurate as of 5/31/18  2:10 PM.  Always use your most recent med list.  
  
  
  
  
 atorvastatin 40 mg tablet Commonly known as:  LIPITOR Take 1 Tab by mouth daily. azithromycin 250 mg tablet Commonly known as:  Amna Sharp Take 1 Tab by mouth See Admin Instructions for 5 days. * buPROPion  mg XL tablet Commonly known as:  WELLBUTRIN XL  
TAKE ONE TABLET BY MOUTH EVERY MORNING  
  
 * buPROPion  mg SR tablet Commonly known as:  Alan Marts With 300 mg qd  
  
 clonazePAM 0.5 mg tablet Commonly known as:  KlonoPIN  
TAKE ONE TO TWO TABLETS BY MOUTH EVERY EVENING  
  
 CO Q-10 PO Take  by mouth.  
  
 lisinopril 5 mg tablet Commonly known as:  PRINIVIL, ZESTRIL  
TAKE ONE TABLET BY MOUTH DAILY MULTIPLE VITAMINS PO Take  by mouth. olopatadine 0.1 % ophthalmic solution Commonly known as:  PATANOL Administer 2 Drops to both eyes two (2) times a day. zolpidem 10 mg tablet Commonly known as:  AMBIEN  
TAKE ONE TABLET BY MOUTH NIGHTLY AS NEEDED FOR SLEEP ZyrTEC 10 mg tablet Generic drug:  cetirizine  
take 10 mg by mouth daily. * Notice: This list has 2 medication(s) that are the same as other medications prescribed for you. Read the directions carefully, and ask your doctor or other care provider to review them with you. Prescriptions Sent to Pharmacy Refills  
 azithromycin (ZITHROMAX) 250 mg tablet 0 Sig: Take 1 Tab by mouth See Admin Instructions for 5 days. Class: Normal  
 Pharmacy: [address] Ph #: [phone] Route: Oral  
  
We Performed the Following AMB POC RAPID STREP A [63200 CPT(R)] CK [id number] CPT(R)] Patient Instructions Muscle Aches: Care Instructions Your Care Instructions Muscle aches have many possible causes. Some common ones are overuse, tension, and injuries such as a strained muscle. An infection such as the flu can cause muscle aches. Or the aches may be caused by some medicines, such as statins. Muscle aches may also be a symptom of a disease like lupus or fibromyalgia. Myalgia is the medical term for muscle aches. The doctor will do a physical exam and ask questions to try to find what is causing your pain. You may also have tests such as blood tests or imaging tests like X-rays. These can help find or rule out serious problems. The doctor has checked you carefully, but problems can develop later. If you notice any problems or new symptoms, get medical treatment right away. Follow-up care is a key part of your treatment and safety. Be sure to make and go to all appointments, and call your doctor if you are having problems. It's also a good idea to know your test results and keep a list of the medicines you take. How can you care for yourself at home? · Rest the area that hurts. You may need to stop or reduce the activity that causes your symptoms. Then you can return to it slowly. · Put ice or a cold pack on the area for 10 to 20 minutes at a time to ease pain. Put a thin cloth between the ice and your skin. · Take an over-the-counter pain medicine, such as acetaminophen (Tylenol), ibuprofen (Advil, Motrin), or naproxen (Aleve). Be safe with medicines. Read and follow all instructions on the label. When should you call for help? Call your doctor now or seek immediate medical care if: 
? · Your pain gets worse. ? · You have new symptoms, such as a fever, swelling, or a rash. ? Watch closely for changes in your health, and be sure to contact your doctor if: 
? · You do not get better as expected. Where can you learn more? Go to http://leanna-newton.info/. Enter G355 in the search box to learn more about \"Muscle Aches: Care Instructions. \" 
 Current as of: October 14, 2016 Content Version: 11.4 © 2870-5465 [employer] Care instructions adapted under license by [employer] (which disclaims liability or warranty for this information). If you have questions about a medical condition or this instruction, always ask your healthcare professional. [address] any warranty or liability for your use of this information. Sinusitis: Care Instructions Your Care Instructions Sinusitis is an infection of the lining of the sinus cavities in your head. Sinusitis often follows a cold. It causes pain and pressure in your head and face. In most cases, sinusitis gets better on its own in 1 to 2 weeks. But some mild symptoms may last for several weeks. Sometimes antibiotics are needed. Follow-up care is a key part of your treatment and safety. Be sure to make and go to all appointments, and call your doctor if you are having problems. It's also a good idea to know your test results and keep a list of the medicines you take. How can you care for yourself at home? · Take an over-the-counter pain medicine, such as acetaminophen (Tylenol), ibuprofen (Advil, Motrin), or naproxen (Aleve). Read and follow all instructions on the label. · If the doctor prescribed antibiotics, take them as directed. Do not stop taking them just because you feel better. You need to take the full course of antibiotics. · Be careful when taking over-the-counter cold or flu medicines and Tylenol at the same time. Many of these medicines have acetaminophen, which is Tylenol. Read the labels to make sure that you are not taking more than the recommended dose. Too much acetaminophen (Tylenol) can be harmful. · Breathe warm, moist air from a steamy shower, a hot bath, or a sink filled with hot water. Avoid cold, dry air. Using a humidifier in your home may help. Follow the directions for cleaning the machine. · Use saline (saltwater) nasal washes to help keep your nasal passages open and wash out mucus and bacteria. You can buy saline nose drops at a grocery store or drugstore. Or you can make your own at home by adding 1 teaspoon of salt and 1 teaspoon of baking soda to 2 cups of distilled water. If you make your own, fill a bulb syringe with the solution, insert the tip into your nostril, and squeeze gently. Yvonna Sins your nose. · Put a hot, wet towel or a warm gel pack on your face 3 or 4 times a day for 5 to 10 minutes each time. · Try a decongestant nasal spray like oxymetazoline (Afrin). Do not use it for more than 3 days in a row. Using it for more than 3 days can make your congestion worse. When should you call for help? Call your doctor now or seek immediate medical care if: 
? · You have new or worse swelling or redness in your face or around your eyes. ? · You have a new or higher fever. ? Watch closely for changes in your health, and be sure to contact your doctor if: 
? · You have new or worse facial pain. ? · The mucus from your nose becomes thicker (like pus) or has new blood in it. ? · You are not getting better as expected. Where can you learn more? Go to http://leanna-newton.info/. Enter G004 in the search box to learn more about \"Sinusitis: Care Instructions. \" Current as of: May 12, 2017 Content Version: 11.4 © 8332-1748 [employer]. Care instructions adapted under license by [employer] (which disclaims liability or warranty for this information). If you have questions about a medical condition or this instruction, always ask your healthcare professional. [address] any warranty or liability for your use of this information. Introducing [facility]! Dear Balwinder Mckeon: Thank you for requesting a Schoology account. Our records indicate that you already have an active Schoology account.   You can access your account anytime at [email or web address] Did you know that you can access your hospital and ER discharge instructions at any time in [employer]? You can also review all of your test results from your hospital stay or ER visit. Additional Information If you have questions, please visit the Frequently Asked Questions section of the [employer] website at [email or web address] Remember, [employer] is NOT to be used for urgent needs. For medical emergencies, dial 911. Now available from your iPhone and Android! Please provide this summary of care documentation to your next provider. Your primary care clinician is listed as Raya 68. If you have any questions after today's visit, please call [phone].

## 2018-05-31 NOTE — PATIENT INSTRUCTIONS
Muscle Aches: Care Instructions  Your Care Instructions    Muscle aches have many possible causes. Some common ones are overuse, tension, and injuries such as a strained muscle. An infection such as the flu can cause muscle aches. Or the aches may be caused by some medicines, such as statins. Muscle aches may also be a symptom of a disease like lupus or fibromyalgia. Myalgia is the medical term for muscle aches. The doctor will do a physical exam and ask questions to try to find what is causing your pain. You may also have tests such as blood tests or imaging tests like X-rays. These can help find or rule out serious problems. The doctor has checked you carefully, but problems can develop later. If you notice any problems or new symptoms, get medical treatment right away. Follow-up care is a key part of your treatment and safety. Be sure to make and go to all appointments, and call your doctor if you are having problems. It's also a good idea to know your test results and keep a list of the medicines you take. How can you care for yourself at home? · Rest the area that hurts. You may need to stop or reduce the activity that causes your symptoms. Then you can return to it slowly. · Put ice or a cold pack on the area for 10 to 20 minutes at a time to ease pain. Put a thin cloth between the ice and your skin. · Take an over-the-counter pain medicine, such as acetaminophen (Tylenol), ibuprofen (Advil, Motrin), or naproxen (Aleve). Be safe with medicines. Read and follow all instructions on the label. When should you call for help? Call your doctor now or seek immediate medical care if:  ? · Your pain gets worse. ? · You have new symptoms, such as a fever, swelling, or a rash. ? Watch closely for changes in your health, and be sure to contact your doctor if:  ? · You do not get better as expected. Where can you learn more? Go to http://leanna-newton.info/.   Enter G355 in the search box to learn more about \"Muscle Aches: Care Instructions. \"  Current as of: October 14, 2016  Content Version: 11.4  © 1790-5095 [employer]. Care instructions adapted under license by [employer] (which disclaims liability or warranty for this information). If you have questions about a medical condition or this instruction, always ask your healthcare professional. [address] any warranty or liability for your use of this information. Sinusitis: Care Instructions  Your Care Instructions    Sinusitis is an infection of the lining of the sinus cavities in your head. Sinusitis often follows a cold. It causes pain and pressure in your head and face. In most cases, sinusitis gets better on its own in 1 to 2 weeks. But some mild symptoms may last for several weeks. Sometimes antibiotics are needed. Follow-up care is a key part of your treatment and safety. Be sure to make and go to all appointments, and call your doctor if you are having problems. It's also a good idea to know your test results and keep a list of the medicines you take. How can you care for yourself at home? · Take an over-the-counter pain medicine, such as acetaminophen (Tylenol), ibuprofen (Advil, Motrin), or naproxen (Aleve). Read and follow all instructions on the label. · If the doctor prescribed antibiotics, take them as directed. Do not stop taking them just because you feel better. You need to take the full course of antibiotics. · Be careful when taking over-the-counter cold or flu medicines and Tylenol at the same time. Many of these medicines have acetaminophen, which is Tylenol. Read the labels to make sure that you are not taking more than the recommended dose. Too much acetaminophen (Tylenol) can be harmful. · Breathe warm, moist air from a steamy shower, a hot bath, or a sink filled with hot water. Avoid cold, dry air. Using a humidifier in your home may help.  Follow the directions for cleaning the machine. · Use saline (saltwater) nasal washes to help keep your nasal passages open and wash out mucus and bacteria. You can buy saline nose drops at a grocery store or drugstore. Or you can make your own at home by adding 1 teaspoon of salt and 1 teaspoon of baking soda to 2 cups of distilled water. If you make your own, fill a bulb syringe with the solution, insert the tip into your nostril, and squeeze gently. Karen Carp your nose. · Put a hot, wet towel or a warm gel pack on your face 3 or 4 times a day for 5 to 10 minutes each time. · Try a decongestant nasal spray like oxymetazoline (Afrin). Do not use it for more than 3 days in a row. Using it for more than 3 days can make your congestion worse. When should you call for help? Call your doctor now or seek immediate medical care if:  ? · You have new or worse swelling or redness in your face or around your eyes. ? · You have a new or higher fever. ? Watch closely for changes in your health, and be sure to contact your doctor if:  ? · You have new or worse facial pain. ? · The mucus from your nose becomes thicker (like pus) or has new blood in it. ? · You are not getting better as expected. Where can you learn more? Go to http://leanna-newton.info/. Enter C597 in the search box to learn more about \"Sinusitis: Care Instructions. \"  Current as of: May 12, 2017  Content Version: 11.4  © 6717-5657 [employer]. Care instructions adapted under license by [employer] (which disclaims liability or warranty for this information). If you have questions about a medical condition or this instruction, always ask your healthcare professional. [address] any warranty or liability for your use of this information.

## 2018-06-20 LAB
25(OH)D3+25(OH)D2 SERPL-MCNC: 36.4 NG/ML (ref 30–100)
ALBUMIN SERPL-MCNC: 4.3 G/DL (ref 3.5–5.5)
ALBUMIN/GLOB SERPL: 2 {RATIO} (ref 1.2–2.2)
ALP SERPL-CCNC: 79 IU/L (ref 39–117)
ALT SERPL-CCNC: 25 IU/L (ref 0–32)
AST SERPL-CCNC: 28 IU/L (ref 0–40)
BASOPHILS # BLD AUTO: 0 X10E3/UL (ref 0–0.2)
BASOPHILS NFR BLD AUTO: 0 %
BILIRUB SERPL-MCNC: 0.6 MG/DL (ref 0–1.2)
BUN SERPL-MCNC: 13 MG/DL (ref 6–24)
BUN/CREAT SERPL: 14 (ref 9–23)
CALCIUM SERPL-MCNC: 9.1 MG/DL (ref 8.7–10.2)
CHLORIDE SERPL-SCNC: 104 MMOL/L (ref 96–106)
CHOLEST SERPL-MCNC: 221 MG/DL (ref 100–199)
CK SERPL-CCNC: 238 U/L (ref 24–173)
CO2 SERPL-SCNC: 22 MMOL/L (ref 20–29)
CREAT SERPL-MCNC: 0.9 MG/DL (ref 0.57–1)
EOSINOPHIL # BLD AUTO: 0.1 X10E3/UL (ref 0–0.4)
EOSINOPHIL NFR BLD AUTO: 2 %
ERYTHROCYTE [DISTWIDTH] IN BLOOD BY AUTOMATED COUNT: 13.6 % (ref 12.3–15.4)
GFR SERPLBLD CREATININE-BSD FMLA CKD-EPI: 72 ML/MIN/1.73
GFR SERPLBLD CREATININE-BSD FMLA CKD-EPI: 83 ML/MIN/1.73
GLOBULIN SER CALC-MCNC: 2.2 G/DL (ref 1.5–4.5)
GLUCOSE SERPL-MCNC: 93 MG/DL (ref 65–99)
HCT VFR BLD AUTO: 43.2 % (ref 34–46.6)
HDLC SERPL-MCNC: 99 MG/DL
HGB BLD-MCNC: 14.2 G/DL (ref 11.1–15.9)
IMM GRANULOCYTES # BLD: 0 X10E3/UL (ref 0–0.1)
IMM GRANULOCYTES NFR BLD: 0 %
INTERPRETATION, 910389: NORMAL
LDLC SERPL CALC-MCNC: 96 MG/DL (ref 0–99)
LYMPHOCYTES # BLD AUTO: 1.4 X10E3/UL (ref 0.7–3.1)
LYMPHOCYTES NFR BLD AUTO: 37 %
MCH RBC QN AUTO: 29.3 PG (ref 26.6–33)
MCHC RBC AUTO-ENTMCNC: 32.9 G/DL (ref 31.5–35.7)
MCV RBC AUTO: 89 FL (ref 79–97)
MONOCYTES # BLD AUTO: 0.3 X10E3/UL (ref 0.1–0.9)
MONOCYTES NFR BLD AUTO: 8 %
NEUTROPHILS # BLD AUTO: 2 X10E3/UL (ref 1.4–7)
NEUTROPHILS NFR BLD AUTO: 53 %
PLATELET # BLD AUTO: 242 X10E3/UL (ref 150–379)
POTASSIUM SERPL-SCNC: 4.4 MMOL/L (ref 3.5–5.2)
PROT SERPL-MCNC: 6.5 G/DL (ref 6–8.5)
RBC # BLD AUTO: 4.85 X10E6/UL (ref 3.77–5.28)
SODIUM SERPL-SCNC: 140 MMOL/L (ref 134–144)
TRIGL SERPL-MCNC: 129 MG/DL (ref 0–149)
TSH SERPL DL<=0.005 MIU/L-ACNC: 3.49 UIU/ML (ref 0.45–4.5)
VLDLC SERPL CALC-MCNC: 26 MG/DL (ref 5–40)
WBC # BLD AUTO: 3.9 X10E3/UL (ref 3.4–10.8)

## 2018-06-22 RX ORDER — ATORVASTATIN CALCIUM 80 MG/1
TABLET, FILM COATED ORAL
Qty: 30 TAB | Refills: 0 | Status: SHIPPED | OUTPATIENT
Start: 2018-06-22 | End: 2019-02-12 | Stop reason: ALTCHOICE

## 2018-08-19 DIAGNOSIS — I10 HTN, GOAL BELOW 130/80: ICD-10-CM

## 2018-08-19 RX ORDER — LISINOPRIL 5 MG/1
TABLET ORAL
Qty: 30 TAB | Refills: 0 | Status: SHIPPED | OUTPATIENT
Start: 2018-08-19 | End: 2018-09-20 | Stop reason: SDUPTHER

## 2018-09-20 DIAGNOSIS — I10 HTN, GOAL BELOW 130/80: ICD-10-CM

## 2018-09-20 RX ORDER — LISINOPRIL 5 MG/1
TABLET ORAL
Qty: 30 TAB | Refills: 0 | Status: SHIPPED | OUTPATIENT
Start: 2018-09-20 | End: 2018-10-19 | Stop reason: SDUPTHER

## 2018-10-19 DIAGNOSIS — I10 HTN, GOAL BELOW 130/80: ICD-10-CM

## 2018-10-19 RX ORDER — LISINOPRIL 5 MG/1
TABLET ORAL
Qty: 30 TAB | Refills: 0 | Status: SHIPPED | OUTPATIENT
Start: 2018-10-19 | End: 2019-02-12 | Stop reason: SDUPTHER

## 2019-02-12 ENCOUNTER — OFFICE VISIT (OUTPATIENT)
Dept: INTERNAL MEDICINE CLINIC | Age: 59
End: 2019-02-12

## 2019-02-12 VITALS
DIASTOLIC BLOOD PRESSURE: 84 MMHG | HEART RATE: 96 BPM | RESPIRATION RATE: 16 BRPM | OXYGEN SATURATION: 96 % | WEIGHT: 155 LBS | HEIGHT: 62 IN | BODY MASS INDEX: 28.52 KG/M2 | TEMPERATURE: 98.4 F | SYSTOLIC BLOOD PRESSURE: 134 MMHG

## 2019-02-12 DIAGNOSIS — E78.00 HYPERCHOLESTEREMIA: Primary | ICD-10-CM

## 2019-02-12 DIAGNOSIS — I10 HTN, GOAL BELOW 130/80: ICD-10-CM

## 2019-02-12 DIAGNOSIS — F51.01 PRIMARY INSOMNIA: ICD-10-CM

## 2019-02-12 DIAGNOSIS — Z12.39 BREAST CANCER SCREENING: ICD-10-CM

## 2019-02-12 DIAGNOSIS — F32.A DEPRESSION, UNSPECIFIED DEPRESSION TYPE: ICD-10-CM

## 2019-02-12 RX ORDER — ATORVASTATIN CALCIUM 40 MG/1
40 TABLET, FILM COATED ORAL DAILY
Qty: 90 TAB | Refills: 2 | Status: SHIPPED | OUTPATIENT
Start: 2019-02-12 | End: 2020-01-12

## 2019-02-12 RX ORDER — BUPROPION HYDROCHLORIDE 100 MG/1
TABLET, EXTENDED RELEASE ORAL
Qty: 90 TAB | Refills: 1 | Status: SHIPPED | OUTPATIENT
Start: 2019-02-12 | End: 2020-01-12

## 2019-02-12 RX ORDER — BUPROPION HYDROCHLORIDE 300 MG/1
TABLET ORAL
Qty: 90 TAB | Refills: 1 | Status: SHIPPED | OUTPATIENT
Start: 2019-02-12 | End: 2019-09-16 | Stop reason: SDUPTHER

## 2019-02-12 RX ORDER — LISINOPRIL 5 MG/1
TABLET ORAL
Qty: 90 TAB | Refills: 1 | Status: SHIPPED | OUTPATIENT
Start: 2019-02-12 | End: 2019-10-16 | Stop reason: SDUPTHER

## 2019-02-12 RX ORDER — ZOLPIDEM TARTRATE 10 MG/1
TABLET ORAL
Qty: 20 TAB | Refills: 0 | Status: SHIPPED | OUTPATIENT
Start: 2019-02-12 | End: 2020-02-27 | Stop reason: ALTCHOICE

## 2019-02-12 NOTE — PROGRESS NOTES
HISTORY OF PRESENT ILLNESS  Jessy Leal is a 63 y.o. female. HPI  Katy Carpenter is seen after absence of about nine months. She has run out of all of her meds and notes she would like to get back on them. She is really working hard on sleep hygiene and uses Ambien only rarely. 20 pills have lasted a year. She notes mood is not as good off the Wellbutrin and would like to resume. Had been on 400 mg a day dose, which had been good. She is off her Lipitor 40. Had trouble tolerating 80, but did tolerate the 40. Her blood pressure is a bit higher than it normally is off Lisinopril 5. She did not have cough. She is not having chest pain. She is able to walk in the hills. She will be going to [address] to visit a friend in June. Review of Systems   Constitutional: Negative for chills, fever, malaise/fatigue and weight loss. Respiratory: Negative for cough, shortness of breath and wheezing. Cardiovascular: Negative for chest pain, palpitations, orthopnea, leg swelling and PND. Gastrointestinal: Negative for abdominal pain, heartburn and nausea. Musculoskeletal: Negative for myalgias. Neurological: Negative for dizziness and headaches. Psychiatric/Behavioral: Positive for depression. The patient has insomnia. Physical Exam   Constitutional: She is oriented to person, place, and time. She appears well-developed and well-nourished. HENT:   Head: Normocephalic and atraumatic. Neck: Normal range of motion. Neck supple. Carotid bruit is not present. No thyromegaly present. Cardiovascular: Normal rate, regular rhythm, S1 normal, S2 normal, normal heart sounds and intact distal pulses. No murmur heard. Pulmonary/Chest: Effort normal and breath sounds normal. No respiratory distress. She has no wheezes. She has no rales. Musculoskeletal: She exhibits no edema. Neurological: She is alert and oriented to person, place, and time. Psychiatric: She has a normal mood and affect.  Her behavior is normal.   Nursing note and vitals reviewed. ASSESSMENT and PLAN  Diagnoses and all orders for this visit:    1. Hypercholesteremia  -     METABOLIC PANEL, COMPREHENSIVE  -     LIPID PANEL  -     TSH 3RD GENERATION  -     atorvastatin (LIPITOR) 40 mg tablet; Take 1 Tab by mouth daily. 2. HTN, goal below 130/80  -     lisinopril (PRINIVIL, ZESTRIL) 5 mg tablet; TAKE ONE TABLET BY MOUTH DAILY    3. Depression, unspecified depression type  -     buPROPion SR (WELLBUTRIN SR) 100 mg SR tablet; TAKE ONE TABLET BY MOUTH DAILY WITH 300MG TABLETS  -     buPROPion XL (WELLBUTRIN XL) 300 mg XL tablet; TAKE ONE TABLET BY MOUTH EVERY MORNING    4. Primary insomnia  -     zolpidem (AMBIEN) 10 mg tablet; TAKE ONE TABLET BY MOUTH NIGHTLY AS NEEDED FOR SLEEP    5. Breast cancer screening  -     [facility] 3D MEL W MAMMO BI SCREENING INCL CAD;  Future      the following changes in treatment are made: resume meds and appt in July   Labs in 3 weeks on meds

## 2019-09-16 DIAGNOSIS — F32.A DEPRESSION, UNSPECIFIED DEPRESSION TYPE: ICD-10-CM

## 2019-09-16 RX ORDER — BUPROPION HYDROCHLORIDE 300 MG/1
TABLET ORAL
Qty: 30 TAB | Refills: 0 | Status: SHIPPED | OUTPATIENT
Start: 2019-09-16 | End: 2019-10-16 | Stop reason: SDUPTHER

## 2019-10-16 DIAGNOSIS — F32.A DEPRESSION, UNSPECIFIED DEPRESSION TYPE: ICD-10-CM

## 2019-10-16 DIAGNOSIS — I10 HTN, GOAL BELOW 130/80: ICD-10-CM

## 2019-10-17 RX ORDER — BUPROPION HYDROCHLORIDE 300 MG/1
TABLET ORAL
Qty: 30 TAB | Refills: 0 | Status: SHIPPED | OUTPATIENT
Start: 2019-10-17 | End: 2019-11-15 | Stop reason: SDUPTHER

## 2019-10-17 RX ORDER — BUPROPION HYDROCHLORIDE 100 MG/1
TABLET, EXTENDED RELEASE ORAL
Qty: 30 TAB | Refills: 0 | Status: SHIPPED | OUTPATIENT
Start: 2019-10-17 | End: 2019-11-15 | Stop reason: SDUPTHER

## 2019-10-17 RX ORDER — LISINOPRIL 5 MG/1
TABLET ORAL
Qty: 30 TAB | Refills: 0 | Status: SHIPPED | OUTPATIENT
Start: 2019-10-17 | End: 2019-11-15 | Stop reason: SDUPTHER

## 2019-10-22 NOTE — TELEPHONE ENCOUNTER
Reached out to patient to assist w/ scheduling a medication evaluation / follow up appointment.  No answer unable to leave V/M, however, sent My Chart message - thank you

## 2019-11-15 DIAGNOSIS — I10 HTN, GOAL BELOW 130/80: ICD-10-CM

## 2019-11-15 DIAGNOSIS — F32.A DEPRESSION, UNSPECIFIED DEPRESSION TYPE: ICD-10-CM

## 2019-11-15 RX ORDER — BUPROPION HYDROCHLORIDE 300 MG/1
TABLET ORAL
Qty: 30 TAB | Refills: 0 | Status: SHIPPED | OUTPATIENT
Start: 2019-11-15 | End: 2019-12-05

## 2019-11-15 RX ORDER — LISINOPRIL 5 MG/1
TABLET ORAL
Qty: 30 TAB | Refills: 0 | Status: SHIPPED | OUTPATIENT
Start: 2019-11-15 | End: 2020-01-12

## 2019-11-15 RX ORDER — BUPROPION HYDROCHLORIDE 100 MG/1
TABLET, EXTENDED RELEASE ORAL
Qty: 30 TAB | Refills: 0 | Status: SHIPPED | OUTPATIENT
Start: 2019-11-15 | End: 2019-12-05

## 2019-12-05 ENCOUNTER — OFFICE VISIT (OUTPATIENT)
Dept: INTERNAL MEDICINE CLINIC | Age: 59
End: 2019-12-05

## 2019-12-05 ENCOUNTER — HOSPITAL ENCOUNTER (OUTPATIENT)
Dept: LAB | Age: 59
Discharge: HOME | End: 2019-12-05

## 2019-12-05 VITALS
RESPIRATION RATE: 18 BRPM | HEIGHT: 62 IN | WEIGHT: 155 LBS | BODY MASS INDEX: 28.52 KG/M2 | DIASTOLIC BLOOD PRESSURE: 84 MMHG | HEART RATE: 87 BPM | SYSTOLIC BLOOD PRESSURE: 123 MMHG | OXYGEN SATURATION: 97 % | TEMPERATURE: 97.9 F

## 2019-12-05 DIAGNOSIS — I10 HTN, GOAL BELOW 130/80: ICD-10-CM

## 2019-12-05 DIAGNOSIS — R10.13 EPIGASTRIC PAIN: Primary | ICD-10-CM

## 2019-12-05 DIAGNOSIS — R11.0 NAUSEA: ICD-10-CM

## 2019-12-05 DIAGNOSIS — R10.13 EPIGASTRIC PAIN: ICD-10-CM

## 2019-12-05 LAB
ALBUMIN SERPL-MCNC: 3.8 G/DL (ref 3.5–5)
ALBUMIN/GLOB SERPL: 1.3 {RATIO} (ref 1.1–2.2)
ALP SERPL-CCNC: 85 U/L (ref 45–117)
ALT SERPL-CCNC: 32 U/L (ref 12–78)
ANION GAP SERPL CALC-SCNC: 5 MMOL/L (ref 5–15)
AST SERPL-CCNC: 16 U/L (ref 15–37)
BASOPHILS # BLD: 0 K/UL (ref 0–0.1)
BASOPHILS NFR BLD: 1 % (ref 0–1)
BILIRUB SERPL-MCNC: 0.5 MG/DL (ref 0.2–1)
BUN SERPL-MCNC: 14 MG/DL (ref 6–20)
BUN/CREAT SERPL: 18 (ref 12–20)
CALCIUM SERPL-MCNC: 9.3 MG/DL (ref 8.5–10.1)
CHLORIDE SERPL-SCNC: 105 MMOL/L (ref 97–108)
CO2 SERPL-SCNC: 30 MMOL/L (ref 21–32)
CREAT SERPL-MCNC: 0.8 MG/DL (ref 0.55–1.02)
DIFFERENTIAL METHOD BLD: NORMAL
EOSINOPHIL # BLD: 0.1 K/UL (ref 0–0.4)
EOSINOPHIL NFR BLD: 1 % (ref 0–7)
ERYTHROCYTE [DISTWIDTH] IN BLOOD BY AUTOMATED COUNT: 13 % (ref 11.5–14.5)
GLOBULIN SER CALC-MCNC: 2.9 G/DL (ref 2–4)
GLUCOSE SERPL-MCNC: 73 MG/DL (ref 65–100)
HCT VFR BLD AUTO: 46.6 % (ref 35–47)
HGB BLD-MCNC: 14.9 G/DL (ref 11.5–16)
IMM GRANULOCYTES # BLD AUTO: 0 K/UL (ref 0–0.04)
IMM GRANULOCYTES NFR BLD AUTO: 0 % (ref 0–0.5)
LYMPHOCYTES # BLD: 1.6 K/UL (ref 0.8–3.5)
LYMPHOCYTES NFR BLD: 25 % (ref 12–49)
MCH RBC QN AUTO: 29.6 PG (ref 26–34)
MCHC RBC AUTO-ENTMCNC: 32 G/DL (ref 30–36.5)
MCV RBC AUTO: 92.5 FL (ref 80–99)
MONOCYTES # BLD: 0.7 K/UL (ref 0–1)
MONOCYTES NFR BLD: 10 % (ref 5–13)
NEUTS SEG # BLD: 4.2 K/UL (ref 1.8–8)
NEUTS SEG NFR BLD: 63 % (ref 32–75)
NRBC # BLD: 0 K/UL (ref 0–0.01)
NRBC BLD-RTO: 0 PER 100 WBC
PLATELET # BLD AUTO: 263 K/UL (ref 150–400)
PMV BLD AUTO: 9.8 FL (ref 8.9–12.9)
POTASSIUM SERPL-SCNC: 4.1 MMOL/L (ref 3.5–5.1)
PROT SERPL-MCNC: 6.7 G/DL (ref 6.4–8.2)
RBC # BLD AUTO: 5.04 M/UL (ref 3.8–5.2)
SODIUM SERPL-SCNC: 140 MMOL/L (ref 136–145)
WBC # BLD AUTO: 6.6 K/UL (ref 3.6–11)

## 2019-12-05 NOTE — PROGRESS NOTES
HISTORY OF PRESENT ILLNESS  Honorio Batista is a 63 y.o. female. HPI   Pt reports that she started to experience intermittent nausea since October. She assumed it was due to Aleve intake, so she DC it with noted improvement. This Sunday (12/01) she experienced nausea and severe abdominal pain. The pain return the night on 12/02. Last night (12/04) the sx returned for a third time. She has switched to a bland diet, only consuming rice, toast, crackers, water, and scrambled eggs. She is scared to eat anything outside of her bland diet, fearing the return of the pain. She continues to feel nauseous. Confirms darker colored stools. Denies current sever pain, hx of smoking, acid reflux, melena or blood in stool. Hypertension ROS: taking medications as instructed, no medication side effects noted, no TIA's, no chest pain on exertion, no dyspnea on exertion, no swelling of ankles. New concerns: BP in office today is 123/84. Review of Systems   All other systems reviewed and are negative. Physical Exam  Vitals signs and nursing note reviewed. Constitutional:       Appearance: She is well-developed. HENT:      Head: Normocephalic and atraumatic. Right Ear: External ear normal.      Left Ear: External ear normal.      Nose: Nose normal.   Eyes:      Conjunctiva/sclera: Conjunctivae normal.      Pupils: Pupils are equal, round, and reactive to light. Neck:      Musculoskeletal: Normal range of motion and neck supple. Cardiovascular:      Rate and Rhythm: Normal rate and regular rhythm. Heart sounds: Normal heart sounds. Pulmonary:      Effort: Pulmonary effort is normal.      Breath sounds: Normal breath sounds. Chest:      Breasts: Breasts are symmetrical.         Right: No inverted nipple, mass, nipple discharge, skin change or tenderness. Left: No inverted nipple, mass, nipple discharge, skin change or tenderness.    Abdominal:      General: Bowel sounds are normal. Palpations: Abdomen is soft. Tenderness: There is tenderness. Comments: RUQ and epigastric pain/discomfort upon palpation   Genitourinary:     Vagina: Normal.      Rectum: Normal. Guaiac result negative. Normal anal tone. Musculoskeletal: Normal range of motion. Skin:     General: Skin is warm and dry. Neurological:      Mental Status: She is alert and oriented to person, place, and time. Psychiatric:         Behavior: Behavior normal.         Thought Content: Thought content normal.         Judgment: Judgment normal.         ASSESSMENT and PLAN  Diagnoses and all orders for this visit:    1. Epigastric pain  Ordered US to confirm or deny gal bladder conditions. Informed pt that if her pain worsens, f/c develop or vomiting occurs she needs to go to ED. Advised pt to not eat for the rest of day- awaiting her upcoming scan. Waiting on scans. Informed pt that if her scan comes back normal she should start on Prilosec. -     US ABD COMP; Future    2. HTN, goal below 130/80  BP is at goal. I do not recommend any change in medications. 3. Nausea  Ordered US to confirm or deny gal bladder conditions. Informed pt that if her pain worsens, f/c develop or vomiting occurs she needs to go to ED. Advised pt to not eat for the rest of day- awaiting her upcoming scan. Waiting on scans. Informed pt that if her scan comes back normal she should start on Prilosec. Lab results and schedule of future lab studies reviewed with patient. Reviewed diet, exercise and weight control. Written by Kamryn Allen, as dictated by Spencer Boone MD.     Current diagnosis and concerns discussed with pt at length. Understands risks and benefits or current treatment plan and medications and accepts the treatment and medication with any possible risks. Pt asks appropriate questions which were answered. Pt instructed to call with any concerns or problems.     This note will not be viewable in MyChart.

## 2019-12-06 ENCOUNTER — HOSPITAL ENCOUNTER (OUTPATIENT)
Dept: ULTRASOUND IMAGING | Age: 59
Discharge: HOME | End: 2019-12-06
Attending: INTERNAL MEDICINE
Payer: COMMERCIAL

## 2019-12-06 DIAGNOSIS — R10.13 EPIGASTRIC PAIN: ICD-10-CM

## 2019-12-06 PROCEDURE — 76700 US EXAM ABDOM COMPLETE: CPT

## 2020-01-12 DIAGNOSIS — F32.A DEPRESSION, UNSPECIFIED DEPRESSION TYPE: ICD-10-CM

## 2020-01-12 DIAGNOSIS — I10 HTN, GOAL BELOW 130/80: ICD-10-CM

## 2020-01-12 DIAGNOSIS — E78.00 HYPERCHOLESTEREMIA: ICD-10-CM

## 2020-01-12 RX ORDER — BUPROPION HYDROCHLORIDE 300 MG/1
TABLET ORAL
Qty: 30 TAB | Refills: 0 | Status: SHIPPED | OUTPATIENT
Start: 2020-01-12 | End: 2020-02-11

## 2020-01-12 RX ORDER — ATORVASTATIN CALCIUM 40 MG/1
TABLET, FILM COATED ORAL
Qty: 30 TAB | Refills: 1 | Status: SHIPPED | OUTPATIENT
Start: 2020-01-12 | End: 2020-02-27 | Stop reason: SDUPTHER

## 2020-01-12 RX ORDER — LISINOPRIL 5 MG/1
TABLET ORAL
Qty: 30 TAB | Refills: 0 | Status: SHIPPED | OUTPATIENT
Start: 2020-01-12 | End: 2020-02-11

## 2020-01-12 RX ORDER — BUPROPION HYDROCHLORIDE 100 MG/1
TABLET, EXTENDED RELEASE ORAL
Qty: 30 TAB | Refills: 0 | Status: SHIPPED | OUTPATIENT
Start: 2020-01-12 | End: 2020-02-11

## 2020-01-28 NOTE — TELEPHONE ENCOUNTER
Reached out to patient to assist w/ scheduling a medication evaluation / follow up appointment.  No answer left V/M & Sent My Chart message - thank you

## 2020-02-11 DIAGNOSIS — F32.A DEPRESSION, UNSPECIFIED DEPRESSION TYPE: ICD-10-CM

## 2020-02-11 DIAGNOSIS — I10 HTN, GOAL BELOW 130/80: ICD-10-CM

## 2020-02-11 RX ORDER — LISINOPRIL 5 MG/1
TABLET ORAL
Qty: 30 TAB | Refills: 0 | Status: SHIPPED | OUTPATIENT
Start: 2020-02-11 | End: 2020-02-27 | Stop reason: SDUPTHER

## 2020-02-11 RX ORDER — BUPROPION HYDROCHLORIDE 300 MG/1
TABLET ORAL
Qty: 30 TAB | Refills: 0 | Status: SHIPPED | OUTPATIENT
Start: 2020-02-11 | End: 2020-02-27 | Stop reason: SDUPTHER

## 2020-02-11 RX ORDER — BUPROPION HYDROCHLORIDE 100 MG/1
TABLET, EXTENDED RELEASE ORAL
Qty: 30 TAB | Refills: 0 | Status: SHIPPED | OUTPATIENT
Start: 2020-02-11 | End: 2020-02-27 | Stop reason: SDUPTHER

## 2020-02-27 ENCOUNTER — HOSPITAL ENCOUNTER (OUTPATIENT)
Dept: LAB | Age: 60
Discharge: HOME | End: 2020-02-27

## 2020-02-27 ENCOUNTER — OFFICE VISIT (OUTPATIENT)
Dept: INTERNAL MEDICINE CLINIC | Age: 60
End: 2020-02-27

## 2020-02-27 VITALS
DIASTOLIC BLOOD PRESSURE: 76 MMHG | TEMPERATURE: 98.3 F | HEART RATE: 76 BPM | HEIGHT: 62 IN | SYSTOLIC BLOOD PRESSURE: 125 MMHG | BODY MASS INDEX: 27.97 KG/M2 | RESPIRATION RATE: 16 BRPM | OXYGEN SATURATION: 95 % | WEIGHT: 152 LBS

## 2020-02-27 DIAGNOSIS — F32.A DEPRESSION, UNSPECIFIED DEPRESSION TYPE: ICD-10-CM

## 2020-02-27 DIAGNOSIS — I10 HTN, GOAL BELOW 130/80: Primary | ICD-10-CM

## 2020-02-27 DIAGNOSIS — Z12.39 BREAST CANCER SCREENING: ICD-10-CM

## 2020-02-27 DIAGNOSIS — E78.00 HYPERCHOLESTEREMIA: ICD-10-CM

## 2020-02-27 DIAGNOSIS — I10 HTN, GOAL BELOW 130/80: ICD-10-CM

## 2020-02-27 LAB
ALBUMIN SERPL-MCNC: 4.2 G/DL (ref 3.5–5)
ALBUMIN/GLOB SERPL: 1.4 {RATIO} (ref 1.1–2.2)
ALP SERPL-CCNC: 92 U/L (ref 45–117)
ALT SERPL-CCNC: 58 U/L (ref 12–78)
ANION GAP SERPL CALC-SCNC: 3 MMOL/L (ref 5–15)
AST SERPL-CCNC: 37 U/L (ref 15–37)
BASOPHILS # BLD: 0 K/UL (ref 0–0.1)
BASOPHILS NFR BLD: 1 % (ref 0–1)
BILIRUB SERPL-MCNC: 0.6 MG/DL (ref 0.2–1)
BUN SERPL-MCNC: 14 MG/DL (ref 6–20)
BUN/CREAT SERPL: 17 (ref 12–20)
CALCIUM SERPL-MCNC: 9.3 MG/DL (ref 8.5–10.1)
CHLORIDE SERPL-SCNC: 107 MMOL/L (ref 97–108)
CHOLEST SERPL-MCNC: 254 MG/DL
CO2 SERPL-SCNC: 29 MMOL/L (ref 21–32)
CREAT SERPL-MCNC: 0.84 MG/DL (ref 0.55–1.02)
DIFFERENTIAL METHOD BLD: NORMAL
EOSINOPHIL # BLD: 0.1 K/UL (ref 0–0.4)
EOSINOPHIL NFR BLD: 3 % (ref 0–7)
ERYTHROCYTE [DISTWIDTH] IN BLOOD BY AUTOMATED COUNT: 13.1 % (ref 11.5–14.5)
GLOBULIN SER CALC-MCNC: 2.9 G/DL (ref 2–4)
GLUCOSE SERPL-MCNC: 90 MG/DL (ref 65–100)
HCT VFR BLD AUTO: 45.8 % (ref 35–47)
HDLC SERPL-MCNC: 116 MG/DL
HDLC SERPL: 2.2 {RATIO} (ref 0–5)
HGB BLD-MCNC: 14.7 G/DL (ref 11.5–16)
IMM GRANULOCYTES # BLD AUTO: 0 K/UL (ref 0–0.04)
IMM GRANULOCYTES NFR BLD AUTO: 0 % (ref 0–0.5)
LDLC SERPL CALC-MCNC: 116.4 MG/DL (ref 0–100)
LIPID PROFILE,FLP: ABNORMAL
LYMPHOCYTES # BLD: 1.4 K/UL (ref 0.8–3.5)
LYMPHOCYTES NFR BLD: 34 % (ref 12–49)
MCH RBC QN AUTO: 29.3 PG (ref 26–34)
MCHC RBC AUTO-ENTMCNC: 32.1 G/DL (ref 30–36.5)
MCV RBC AUTO: 91.2 FL (ref 80–99)
MONOCYTES # BLD: 0.4 K/UL (ref 0–1)
MONOCYTES NFR BLD: 10 % (ref 5–13)
NEUTS SEG # BLD: 2.2 K/UL (ref 1.8–8)
NEUTS SEG NFR BLD: 52 % (ref 32–75)
NRBC # BLD: 0 K/UL (ref 0–0.01)
NRBC BLD-RTO: 0 PER 100 WBC
PLATELET # BLD AUTO: 245 K/UL (ref 150–400)
PMV BLD AUTO: 10.1 FL (ref 8.9–12.9)
POTASSIUM SERPL-SCNC: 4.5 MMOL/L (ref 3.5–5.1)
PROT SERPL-MCNC: 7.1 G/DL (ref 6.4–8.2)
RBC # BLD AUTO: 5.02 M/UL (ref 3.8–5.2)
SODIUM SERPL-SCNC: 139 MMOL/L (ref 136–145)
TRIGL SERPL-MCNC: 108 MG/DL (ref ?–150)
TSH SERPL DL<=0.05 MIU/L-ACNC: 3.02 UIU/ML (ref 0.36–3.74)
VLDLC SERPL CALC-MCNC: 21.6 MG/DL
WBC # BLD AUTO: 4.2 K/UL (ref 3.6–11)

## 2020-02-27 RX ORDER — BUPROPION HYDROCHLORIDE 300 MG/1
TABLET ORAL
Qty: 90 TAB | Refills: 2 | Status: SHIPPED | OUTPATIENT
Start: 2020-02-27 | End: 2020-03-13

## 2020-02-27 RX ORDER — LISINOPRIL 5 MG/1
TABLET ORAL
Qty: 90 TAB | Refills: 2 | Status: SHIPPED | OUTPATIENT
Start: 2020-02-27 | End: 2020-03-13

## 2020-02-27 RX ORDER — BUPROPION HYDROCHLORIDE 100 MG/1
TABLET, EXTENDED RELEASE ORAL
Qty: 90 TAB | Refills: 2 | Status: SHIPPED | OUTPATIENT
Start: 2020-02-27 | End: 2020-03-13

## 2020-02-27 RX ORDER — ATORVASTATIN CALCIUM 40 MG/1
TABLET, FILM COATED ORAL
Qty: 90 TAB | Refills: 2 | Status: SHIPPED | OUTPATIENT
Start: 2020-02-27 | End: 2020-03-13

## 2020-02-27 NOTE — PROGRESS NOTES
HISTORY OF PRESENT ILLNESS  Efrain Lu is a 63 y.o. female. [facility] is seen for med check. Issues:  1. Hypertension, on Lisinopril 5 mg. No cough or chest pain. BP looks good. 2. Dyslipidemia, on Lipitor 40. No myalgias, no cardiac symptoms. Will do labs. 3. Mood, stable on the 400 of Wellbutrin. Has not needed any Ambien, using a meditation jenny. 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      Review of Systems   Constitutional: Negative for chills, fever and weight loss. Respiratory: Negative for cough, shortness of breath and wheezing. Cardiovascular: Negative for chest pain, palpitations, orthopnea, leg swelling and PND. Gastrointestinal: Negative for abdominal pain, diarrhea, heartburn and nausea. Musculoskeletal: Negative for myalgias. Neurological: Negative for dizziness and headaches. Psychiatric/Behavioral: Negative for depression. The patient is not nervous/anxious and does not have insomnia. Physical Exam  Vitals signs and nursing note reviewed. Constitutional:       Appearance: She is well-developed. HENT:      Head: Normocephalic and atraumatic. Neck:      Musculoskeletal: Normal range of motion and neck supple. Thyroid: No thyromegaly. Vascular: No carotid bruit. Cardiovascular:      Rate and Rhythm: Normal rate and regular rhythm. Heart sounds: Normal heart sounds, S1 normal and S2 normal. No murmur. Pulmonary:      Effort: Pulmonary effort is normal. No respiratory distress. Breath sounds: Normal breath sounds. No wheezing or rales. Neurological:      Mental Status: She is alert and oriented to person, place, and time. Psychiatric:         Behavior: Behavior normal.         ASSESSMENT and PLAN  Diagnoses and all orders for this visit:    1. HTN, goal below 130/80  -     CBC WITH AUTOMATED DIFF; Future  -     lisinopril (PRINIVIL, ZESTRIL) 5 mg tablet; 1 po qd    2.  Hypercholesteremia  -     METABOLIC PANEL, COMPREHENSIVE; Future  -     LIPID PANEL; Future  -     TSH 3RD GENERATION; Future  -     atorvastatin (LIPITOR) 40 mg tablet; TAKE ONE TABLET BY MOUTH DAILY    3. Depression, unspecified depression type  -     buPROPion SR (WELLBUTRIN SR) 100 mg SR tablet; TAKE ONE TABLET BY MOUTH DAILY *TAKE WITH 300 MG  -     buPROPion XL (WELLBUTRIN XL) 300 mg XL tablet; TAKE ONE TABLET BY MOUTH DAILY *TAKE WITH 100 MG    4. Breast cancer screening  -     [facility] 3D MEL W MAMMO BI SCREENING INCL CAD;  Future

## 2020-03-12 DIAGNOSIS — F32.A DEPRESSION, UNSPECIFIED DEPRESSION TYPE: ICD-10-CM

## 2020-03-12 DIAGNOSIS — E78.00 HYPERCHOLESTEREMIA: ICD-10-CM

## 2020-03-12 DIAGNOSIS — I10 HTN, GOAL BELOW 130/80: ICD-10-CM

## 2020-03-13 RX ORDER — BUPROPION HYDROCHLORIDE 300 MG/1
TABLET ORAL
Qty: 30 TAB | Refills: 2 | Status: SHIPPED | OUTPATIENT
Start: 2020-03-13 | End: 2021-04-02

## 2020-03-13 RX ORDER — LISINOPRIL 5 MG/1
TABLET ORAL
Qty: 30 TAB | Refills: 2 | Status: SHIPPED | OUTPATIENT
Start: 2020-03-13 | End: 2021-04-02

## 2020-03-13 RX ORDER — ATORVASTATIN CALCIUM 40 MG/1
TABLET, FILM COATED ORAL
Qty: 30 TAB | Refills: 2 | Status: SHIPPED | OUTPATIENT
Start: 2020-03-13 | End: 2021-04-02

## 2020-03-13 RX ORDER — BUPROPION HYDROCHLORIDE 100 MG/1
TABLET, EXTENDED RELEASE ORAL
Qty: 30 TAB | Refills: 2 | Status: SHIPPED | OUTPATIENT
Start: 2020-03-13 | End: 2021-04-02

## 2020-04-28 ENCOUNTER — OFFICE VISIT (OUTPATIENT)
Dept: INTERNAL MEDICINE CLINIC | Age: 60
End: 2020-04-28

## 2020-04-28 ENCOUNTER — HOSPITAL ENCOUNTER (OUTPATIENT)
Dept: LAB | Age: 60
Discharge: HOME | End: 2020-04-28

## 2020-04-28 ENCOUNTER — HOSPITAL ENCOUNTER (OUTPATIENT)
Dept: LAB | Age: 60
Discharge: HOME | End: 2020-04-28
Payer: COMMERCIAL

## 2020-04-28 VITALS
HEIGHT: 62 IN | OXYGEN SATURATION: 96 % | TEMPERATURE: 98.3 F | BODY MASS INDEX: 27.79 KG/M2 | WEIGHT: 151 LBS | HEART RATE: 77 BPM | SYSTOLIC BLOOD PRESSURE: 121 MMHG | RESPIRATION RATE: 18 BRPM | DIASTOLIC BLOOD PRESSURE: 86 MMHG

## 2020-04-28 DIAGNOSIS — Z01.419 WELL WOMAN EXAM WITH ROUTINE GYNECOLOGICAL EXAM: ICD-10-CM

## 2020-04-28 DIAGNOSIS — E78.2 MIXED HYPERLIPIDEMIA: ICD-10-CM

## 2020-04-28 DIAGNOSIS — I10 HTN, GOAL BELOW 130/80: ICD-10-CM

## 2020-04-28 DIAGNOSIS — Z12.39 BREAST CANCER SCREENING: ICD-10-CM

## 2020-04-28 DIAGNOSIS — K63.5 POLYP OF ASCENDING COLON, UNSPECIFIED TYPE: ICD-10-CM

## 2020-04-28 DIAGNOSIS — Z01.419 WELL WOMAN EXAM WITH ROUTINE GYNECOLOGICAL EXAM: Primary | ICD-10-CM

## 2020-04-28 LAB
ALBUMIN SERPL-MCNC: 3.9 G/DL (ref 3.5–5)
ALBUMIN/GLOB SERPL: 1.3 {RATIO} (ref 1.1–2.2)
ALP SERPL-CCNC: 93 U/L (ref 45–117)
ALT SERPL-CCNC: 38 U/L (ref 12–78)
ANION GAP SERPL CALC-SCNC: 7 MMOL/L (ref 5–15)
AST SERPL-CCNC: 27 U/L (ref 15–37)
BASOPHILS # BLD: 0 K/UL (ref 0–0.1)
BASOPHILS NFR BLD: 1 % (ref 0–1)
BILIRUB SERPL-MCNC: 0.6 MG/DL (ref 0.2–1)
BUN SERPL-MCNC: 15 MG/DL (ref 6–20)
BUN/CREAT SERPL: 17 (ref 12–20)
CALCIUM SERPL-MCNC: 9.3 MG/DL (ref 8.5–10.1)
CHLORIDE SERPL-SCNC: 106 MMOL/L (ref 97–108)
CHOLEST SERPL-MCNC: 230 MG/DL
CO2 SERPL-SCNC: 27 MMOL/L (ref 21–32)
CREAT SERPL-MCNC: 0.87 MG/DL (ref 0.55–1.02)
DIFFERENTIAL METHOD BLD: NORMAL
EOSINOPHIL # BLD: 0.1 K/UL (ref 0–0.4)
EOSINOPHIL NFR BLD: 2 % (ref 0–7)
ERYTHROCYTE [DISTWIDTH] IN BLOOD BY AUTOMATED COUNT: 13.2 % (ref 11.5–14.5)
EST. AVERAGE GLUCOSE BLD GHB EST-MCNC: 111 MG/DL
GLOBULIN SER CALC-MCNC: 3 G/DL (ref 2–4)
GLUCOSE SERPL-MCNC: 90 MG/DL (ref 65–100)
HBA1C MFR BLD: 5.5 % (ref 4–5.6)
HCT VFR BLD AUTO: 44.8 % (ref 35–47)
HDLC SERPL-MCNC: 86 MG/DL
HDLC SERPL: 2.7 {RATIO} (ref 0–5)
HGB BLD-MCNC: 14.1 G/DL (ref 11.5–16)
IMM GRANULOCYTES # BLD AUTO: 0 K/UL (ref 0–0.04)
IMM GRANULOCYTES NFR BLD AUTO: 0 % (ref 0–0.5)
LDLC SERPL CALC-MCNC: 125.2 MG/DL (ref 0–100)
LIPID PROFILE,FLP: ABNORMAL
LYMPHOCYTES # BLD: 1.5 K/UL (ref 0.8–3.5)
LYMPHOCYTES NFR BLD: 35 % (ref 12–49)
MCH RBC QN AUTO: 28.7 PG (ref 26–34)
MCHC RBC AUTO-ENTMCNC: 31.5 G/DL (ref 30–36.5)
MCV RBC AUTO: 91.1 FL (ref 80–99)
MONOCYTES # BLD: 0.3 K/UL (ref 0–1)
MONOCYTES NFR BLD: 8 % (ref 5–13)
NEUTS SEG # BLD: 2.3 K/UL (ref 1.8–8)
NEUTS SEG NFR BLD: 54 % (ref 32–75)
NRBC # BLD: 0 K/UL (ref 0–0.01)
NRBC BLD-RTO: 0 PER 100 WBC
PLATELET # BLD AUTO: 243 K/UL (ref 150–400)
PMV BLD AUTO: 10 FL (ref 8.9–12.9)
POTASSIUM SERPL-SCNC: 4.2 MMOL/L (ref 3.5–5.1)
PROT SERPL-MCNC: 6.9 G/DL (ref 6.4–8.2)
RBC # BLD AUTO: 4.92 M/UL (ref 3.8–5.2)
SODIUM SERPL-SCNC: 140 MMOL/L (ref 136–145)
TRIGL SERPL-MCNC: 94 MG/DL (ref ?–150)
TSH SERPL DL<=0.05 MIU/L-ACNC: 2.92 UIU/ML (ref 0.36–3.74)
VLDLC SERPL CALC-MCNC: 18.8 MG/DL
WBC # BLD AUTO: 4.2 K/UL (ref 3.6–11)

## 2020-04-28 PROCEDURE — 87624 HPV HI-RISK TYP POOLED RSLT: CPT

## 2020-04-28 PROCEDURE — 88175 CYTOPATH C/V AUTO FLUID REDO: CPT

## 2020-04-28 NOTE — PROGRESS NOTES
Subjective:   63 y.o. female for [employer] Woman [facility]. Patient's last menstrual period was 10/30/2011. Social History: not sexually active. 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      Patient Active Problem List    Diagnosis Date Noted    Colon polyp 12/21/2016    Hypercholesteremia 03/17/2010    Depressive disorder, not elsewhere classified 03/18/2009    Low back pain 01/15/2009     Current Outpatient Medications   Medication Sig Dispense Refill    buPROPion XL (WELLBUTRIN XL) 300 mg XL tablet TAKE ONE TABLET BY MOUTH DAILY *TAKE WITH 100MG* 30 Tab 2    atorvastatin (LIPITOR) 40 mg tablet TAKE ONE TABLET BY MOUTH DAILY 30 Tab 2    buPROPion SR (WELLBUTRIN SR) 100 mg SR tablet TAKE ONE TABLET BY MOUTH DAILY *TAKE WITH 300MG* 30 Tab 2    lisinopriL (PRINIVIL, ZESTRIL) 5 mg tablet TAKE ONE TABLET BY MOUTH DAILY 30 Tab 2    UBIDECARENONE (CO Q-10 PO) Take  by mouth.  MULTIVITAMINS (MULTIPLE VITAMINS PO) Take  by mouth.  ZYRTEC 10 mg Tab take 10 mg by mouth daily.  olopatadine (PATANOL) 0.1 % ophthalmic solution Administer 2 Drops to both eyes two (2) times a day.  15 mL 1     No Known Allergies  Past Medical History:   Diagnosis Date    Colon polyp 12/21/2016 7/12 adenomatous polyp  5 year repeat dr ambrocio   [facility] Depressive disorder, not elsewhere classified 3/18/2009    Hypercholesteremia 3/17/2010    Hyperlipidemia LDL goal < 130     Low back pain 1/15/2009    Tubular adenoma polyp of rectum 07/25/2012     Past Surgical History:   Procedure Laterality Date    ENDOSCOPY, COLON, DIAGNOSTIC  7/12    polyps removed dr ambrocio,  3 year repeat     Family History   Problem Relation Age of Onset    Hypertension Father     Heart Failure Father     Stroke Father      Social History     Tobacco Use    Smoking status: Never Smoker    Smokeless tobacco: Never Used   Substance Use Topics    Alcohol use: No        ROS:  Feeling well. No dyspnea or chest pain on exertion. No abdominal pain, change in bowel habits, black or bloody stools. No urinary tract symptoms. GYN ROS: no breast pain or new or enlarging lumps on self exam, no vaginal bleeding, no discharge or pelvic pain, no hot flashes. No neurological complaints. Objective:     Visit Vitals  /86 (BP 1 Location: Left arm, BP Patient Position: Sitting)   Pulse 77   Temp 98.3 °F (36.8 °C) (Oral)   Resp 18   Ht 5' 1.5\" (1.562 m)   Wt 151 lb (68.5 kg)   LMP 10/30/2011   SpO2 96%   BMI 28.07 kg/m²     The patient appears well, alert, oriented x 3, in no distress. ENT normal.  Neck supple. No adenopathy or thyromegaly. KHLOE. Lungs are clear, good air entry, no wheezes, rhonchi or rales. S1 and S2 normal, no murmurs, regular rate and rhythm. Abdomen soft without tenderness, guarding, mass or organomegaly. Extremities show no edema, normal peripheral pulses. Neurological is normal, no focal findings. BREAST EXAM: breasts appear normal, no suspicious masses, no skin or nipple changes or axillary nodes    PELVIC EXAM: normal external genitalia, vulva, vagina, cervix, uterus and adnexa, PAP: Pap smear done today, thin-prep method    Assessment/Plan:   well woman  mammogram  pap smear  Diagnoses and all orders for this visit:    1. Well woman exam with routine gynecological exam  -     METABOLIC PANEL, COMPREHENSIVE; Future  -     LIPID PANEL; Future  -     TSH 3RD GENERATION; Future  -     CBC WITH AUTOMATED DIFF; Future  -     HEMOGLOBIN A1C WITH EAG; Future  -     PAP IG, APTIMA HPV AND RFX 16/18,45 ([id number]); Future    2. Breast cancer screening  -     [facility] 3D MEL W MAMMO BI SCREENING INCL CAD; Future    3. HTN, goal below 130/80-cont lisinopril no side effects    4. Polyp of ascending colon, unspecified type  duew for colonoscopy  5.  Mixed hyperlipidemia  Cont the lipitor and check labs  .

## 2021-01-08 ENCOUNTER — PATIENT MESSAGE (OUTPATIENT)
Dept: INTERNAL MEDICINE CLINIC | Age: 61
End: 2021-01-08

## 2021-04-01 DIAGNOSIS — E78.00 HYPERCHOLESTEREMIA: ICD-10-CM

## 2021-04-01 DIAGNOSIS — I10 HTN, GOAL BELOW 130/80: ICD-10-CM

## 2021-04-01 DIAGNOSIS — F32.A DEPRESSION, UNSPECIFIED DEPRESSION TYPE: ICD-10-CM

## 2021-04-02 RX ORDER — BUPROPION HYDROCHLORIDE 300 MG/1
TABLET ORAL
Qty: 90 TAB | Refills: 0 | Status: SHIPPED | OUTPATIENT
Start: 2021-04-02 | End: 2021-06-30

## 2021-04-02 RX ORDER — LISINOPRIL 5 MG/1
TABLET ORAL
Qty: 90 TAB | Refills: 0 | Status: SHIPPED | OUTPATIENT
Start: 2021-04-02 | End: 2021-08-02

## 2021-04-02 RX ORDER — BUPROPION HYDROCHLORIDE 100 MG/1
TABLET, EXTENDED RELEASE ORAL
Qty: 90 TAB | Refills: 1 | Status: SHIPPED | OUTPATIENT
Start: 2021-04-02 | End: 2021-11-02

## 2021-04-02 RX ORDER — ATORVASTATIN CALCIUM 40 MG/1
TABLET, FILM COATED ORAL
Qty: 90 TAB | Refills: 0 | Status: SHIPPED | OUTPATIENT
Start: 2021-04-02 | End: 2021-08-02

## 2021-06-30 DIAGNOSIS — F32.A DEPRESSION, UNSPECIFIED DEPRESSION TYPE: ICD-10-CM

## 2021-06-30 RX ORDER — BUPROPION HYDROCHLORIDE 300 MG/1
TABLET ORAL
Qty: 30 TABLET | Refills: 0 | Status: SHIPPED | OUTPATIENT
Start: 2021-06-30 | End: 2021-09-03

## 2021-07-30 DIAGNOSIS — E78.00 HYPERCHOLESTEREMIA: ICD-10-CM

## 2021-07-30 DIAGNOSIS — I10 HTN, GOAL BELOW 130/80: ICD-10-CM

## 2021-08-02 RX ORDER — LISINOPRIL 5 MG/1
TABLET ORAL
Qty: 30 TABLET | Refills: 0 | Status: SHIPPED | OUTPATIENT
Start: 2021-08-02

## 2021-08-02 RX ORDER — ATORVASTATIN CALCIUM 40 MG/1
TABLET, FILM COATED ORAL
Qty: 30 TABLET | Refills: 0 | Status: SHIPPED | OUTPATIENT
Start: 2021-08-02

## 2021-09-03 DIAGNOSIS — F32.A DEPRESSION, UNSPECIFIED DEPRESSION TYPE: ICD-10-CM

## 2021-09-03 RX ORDER — BUPROPION HYDROCHLORIDE 300 MG/1
TABLET ORAL
Qty: 14 TABLET | Refills: 0 | Status: SHIPPED | OUTPATIENT
Start: 2021-09-03

## 2021-11-02 DIAGNOSIS — F32.A DEPRESSION, UNSPECIFIED DEPRESSION TYPE: ICD-10-CM

## 2021-11-02 RX ORDER — BUPROPION HYDROCHLORIDE 100 MG/1
TABLET, EXTENDED RELEASE ORAL
Qty: 14 TABLET | Refills: 0 | Status: SHIPPED | OUTPATIENT
Start: 2021-11-02

## 2022-11-08 ENCOUNTER — OFFICE VISIT (OUTPATIENT)
Dept: INTERNAL MEDICINE CLINIC | Age: 62
End: 2022-11-08
Attending: NURSE PRACTITIONER
Payer: COMMERCIAL

## 2022-11-08 ENCOUNTER — HOSPITAL ENCOUNTER (OUTPATIENT)
Dept: GENERAL RADIOLOGY | Age: 62
Discharge: HOME | End: 2022-11-08
Attending: NURSE PRACTITIONER
Payer: COMMERCIAL

## 2022-11-08 VITALS
RESPIRATION RATE: 20 BRPM | HEART RATE: 86 BPM | WEIGHT: 150 LBS | SYSTOLIC BLOOD PRESSURE: 129 MMHG | DIASTOLIC BLOOD PRESSURE: 88 MMHG | BODY MASS INDEX: 29.45 KG/M2 | HEIGHT: 60 IN | OXYGEN SATURATION: 98 %

## 2022-11-08 DIAGNOSIS — R05.3 CHRONIC COUGH: Primary | ICD-10-CM

## 2022-11-08 DIAGNOSIS — R05.3 CHRONIC COUGH: ICD-10-CM

## 2022-11-08 PROCEDURE — 99213 OFFICE O/P EST LOW 20 MIN: CPT | Performed by: NURSE PRACTITIONER

## 2022-11-08 PROCEDURE — 71046 X-RAY EXAM CHEST 2 VIEWS: CPT

## 2022-11-08 RX ORDER — OMEPRAZOLE 20 MG/1
20 CAPSULE, DELAYED RELEASE ORAL DAILY
COMMUNITY

## 2022-11-08 RX ORDER — MONTELUKAST SODIUM 10 MG/1
10 TABLET ORAL DAILY
Qty: 30 TABLET | Refills: 1 | Status: SHIPPED | OUTPATIENT
Start: 2022-11-08

## 2022-11-08 NOTE — PROGRESS NOTES
Chief Complaint   Patient presents with    Cough     Pt stated she's had this wet cough since mid Dec.       Visit Vitals  LMP 10/30/2011

## 2022-11-08 NOTE — PROGRESS NOTES
Rasta Phillips (: 1960) is a 65 y.o. female, established patient, here for evaluation of the following chief complaint(s):  Cough (Pt stated she's had this wet cough since mid Dec.)       ASSESSMENT/PLAN:  Below is the assessment and plan developed based on review of pertinent history, physical exam, labs, studies, and medications. 1. Chronic cough --has not had improvement with antihistamines or PPI for reflux; will send for chest x-ray and start trial of montelukast.  Advised to contact office if she develops any purulent drainage or shortness of breath. -     B PERTUSSIS AB IGG/IGM; Future  -     XR CHEST PA LAT; Future  -     montelukast (SINGULAIR) 10 mg tablet; Take 1 Tablet by mouth daily. , Normal, Disp-30 Tablet, R-1      She has appointment for CPE with Dr. Caesar Alarcon in 2022. SUBJECTIVE/OBJECTIVE:  HPI  Patient of Dr. Caesar Alarcon who was last seen in office in 2020 presents with complaints of persistent cough since 2021. Reports that she had a upper respiratory infection at that time and developed a loose cough which has never truly resolved. Denies fever, chills, weight loss, shortness of breath, wheezing. Has tried OTC Zyrtec and Flonase nasal spray for allergies without any effect on cough; has also tried Claritin OTC without improvement. Tends to have persistent postnasal drainage throughout the year. Started taking omeprazole 20 mg OTC 4 weeks ago thinking that cough might be related to acid reflux; has not noted any improvement with PPI. 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 Denies history of asthma, smoking, exposure to ill individuals. Has been able to expectorate a very small amount of sputum in the morning upon awakening but denies any purulent drainage. She has stopped taking all of her routine medications including atorvastatin and lisinopril.   She will follow-up with Dr. Caesar Alarcon at her upcoming physical appointment. Patient Active Problem List   Diagnosis Code    Low back pain M54.50    Depressive disorder, not elsewhere classified F32.89    Hypercholesteremia E78.00    Colon polyp K63.5     Past Surgical History:   Procedure Laterality Date    ENDOSCOPY, COLON, DIAGNOSTIC  7/12    polyps removed dr ambrocio,  3 year repeat     Social History     Socioeconomic History    Marital status: LEGALLY      Spouse name: Not on file    Number of children: Not on file    Years of education: Not on file    Highest education level: Not on file   Occupational History    Not on file   Tobacco Use    Smoking status: Never    Smokeless tobacco: Never   Substance and Sexual Activity    Alcohol use: No    Drug use: No    Sexual activity: Never   Other Topics Concern    Not on file   Social History Narrative    Not on file     Social Determinants of Health     Financial Resource Strain: Not on file   Food Insecurity: Not on file   Transportation Needs: Not on file   Physical Activity: Not on file   Stress: Not on file   Social Connections: Not on file   Intimate Partner Violence: Not on file   Housing Stability: Not on file     Family History   Problem Relation Age of Onset    Hypertension Father     Heart Failure Father     Stroke Father      Current Outpatient Medications   Medication Sig    omeprazole (PRILOSEC) 20 mg capsule Take 20 mg by mouth daily. montelukast (SINGULAIR) 10 mg tablet Take 1 Tablet by mouth daily. buPROPion SR (WELLBUTRIN SR) 100 mg SR tablet TAKE ONE TABLET BY MOUTH DAILY WITH 300 MG-please make med check appt for more pills thanks (Patient not taking: Reported on 11/8/2022)    buPROPion XL (WELLBUTRIN XL) 300 mg XL tablet TAKE ONE TABLET BY MOUTH DAILY WITH 100MG-please make appt thanks (Patient not taking: Reported on 11/8/2022)    atorvastatin (LIPITOR) 40 mg tablet TAKE ONE TABLET BY MOUTH DAILY Please make a med check appointment in the next month. Thank you.  (Patient not taking: Reported on 11/8/2022)    lisinopriL (PRINIVIL, ZESTRIL) 5 mg tablet TAKE ONE TABLET BY MOUTH DAILY (Patient not taking: Reported on 11/8/2022)    olopatadine (PATANOL) 0.1 % ophthalmic solution Administer 2 Drops to both eyes two (2) times a day. (Patient not taking: Reported on 11/8/2022)    UBIDECARENONE (CO Q-10 PO) Take  by mouth. (Patient not taking: Reported on 11/8/2022)    MULTIVITAMINS (MULTIPLE VITAMINS PO) Take  by mouth. (Patient not taking: Reported on 11/8/2022)    ZYRTEC 10 mg Tab take 10 mg by mouth daily. (Patient not taking: Reported on 11/8/2022)     No current facility-administered medications for this visit. No Known Allergies  Immunization History   Administered Date(s) Administered    (RETIRED) Pneumococcal Vaccine (Unspecified Type) 10/01/2007    Hepatitis A Vaccine 03/01/2007    Hepatitis B Vaccine 03/01/2007, 02/04/2009    Influenza Vaccine 10/30/2013    Influenza Vaccine Split 10/07/2010, 10/31/2012    Influenza Vaccine Whole 10/01/2008    Influenza, FLUARIX, FLULAVAL, FLUZONE (age 10 mo+) AND AFLURIA, (age 1 y+), PF, 0.5mL 11/14/2016    TD Vaccine 01/01/2005    Tdap 04/11/2017          Review of Systems   Constitutional:  Negative for chills, fatigue and fever. HENT:  Positive for postnasal drip. Negative for congestion, rhinorrhea, sinus pressure, sinus pain and sore throat. Respiratory:  Positive for cough. Negative for chest tightness, shortness of breath and wheezing. Cardiovascular:  Negative for chest pain and leg swelling. Musculoskeletal:  Negative for back pain. Neurological:  Negative for dizziness and headaches. /88   Pulse 86   Resp 20   Ht 5' (1.524 m)   Wt 150 lb (68 kg)   LMP 10/30/2011   SpO2 98%   BMI 29.29 kg/m²    Physical Exam  Vitals and nursing note reviewed. Constitutional:       General: She is not in acute distress. Appearance: Normal appearance. HENT:      Head: Normocephalic and atraumatic.       Right Ear: Tympanic membrane, ear canal and external ear normal.      Left Ear: Tympanic membrane, ear canal and external ear normal.      Nose: Nose normal.      Mouth/Throat:      Mouth: Mucous membranes are moist.      Comments: Clear postnasal drainage noted to posterior pharynx  Cardiovascular:      Rate and Rhythm: Normal rate and regular rhythm. Pulmonary:      Effort: Pulmonary effort is normal.      Breath sounds: Normal breath sounds. No wheezing, rhonchi or rales. Comments: Occasional loose cough during visit  Musculoskeletal:      Cervical back: Normal range of motion and neck supple. Skin:     General: Skin is warm and dry. Neurological:      General: No focal deficit present. Mental Status: She is alert and oriented to person, place, and time. Psychiatric:         Mood and Affect: Mood normal.         Behavior: Behavior normal.             An electronic signature was used to authenticate this note.   -- Ralph Chanel NP

## 2022-11-10 LAB
B PERT IGG SER-ACNC: 2.28 INDEX (ref 0–0.94)
B PERT IGM SER QL IA: <1 INDEX (ref 0–0.9)

## 2022-12-12 ENCOUNTER — OFFICE VISIT (OUTPATIENT)
Dept: INTERNAL MEDICINE CLINIC | Age: 62
End: 2022-12-12
Payer: COMMERCIAL

## 2022-12-12 VITALS
DIASTOLIC BLOOD PRESSURE: 94 MMHG | RESPIRATION RATE: 16 BRPM | SYSTOLIC BLOOD PRESSURE: 143 MMHG | BODY MASS INDEX: 30.19 KG/M2 | HEART RATE: 100 BPM | HEIGHT: 60 IN | TEMPERATURE: 97.9 F | WEIGHT: 153.8 LBS | OXYGEN SATURATION: 97 %

## 2022-12-12 DIAGNOSIS — E78.00 HYPERCHOLESTEREMIA: Primary | ICD-10-CM

## 2022-12-12 DIAGNOSIS — Z12.31 BREAST CANCER SCREENING BY MAMMOGRAM: ICD-10-CM

## 2022-12-12 DIAGNOSIS — F51.02 ADJUSTMENT INSOMNIA: ICD-10-CM

## 2022-12-12 DIAGNOSIS — R05.3 CHRONIC COUGH: ICD-10-CM

## 2022-12-12 DIAGNOSIS — I10 HTN, GOAL BELOW 130/80: ICD-10-CM

## 2022-12-12 PROCEDURE — 99214 OFFICE O/P EST MOD 30 MIN: CPT | Performed by: INTERNAL MEDICINE

## 2022-12-12 RX ORDER — AZELASTINE 1 MG/ML
1 SPRAY, METERED NASAL 2 TIMES DAILY
Qty: 1 EACH | Refills: 3 | Status: SHIPPED | OUTPATIENT
Start: 2022-12-12

## 2022-12-12 RX ORDER — ZOLPIDEM TARTRATE 5 MG/1
5 TABLET ORAL
Qty: 20 TABLET | Refills: 0 | Status: SHIPPED | OUTPATIENT
Start: 2022-12-12

## 2022-12-12 RX ORDER — VALSARTAN 80 MG/1
80 TABLET ORAL DAILY
Qty: 30 TABLET | Refills: 5 | Status: SHIPPED | OUTPATIENT
Start: 2022-12-12

## 2022-12-12 NOTE — PROGRESS NOTES
HISTORY OF PRESENT ILLNESS  Tino Eckert is a 65 y.o. female. HPI  Rose Allen is seen for med evaluation, but she has been having a cough for over a year now with some sinus drainage and postnasal drainage. She tried proton pump inhibitor for two months, made no difference. She tried Singulair for a month, made no difference. She has had a normal chest xray. She does not feel ill. She wonders about mold allergies, wonders about COPD. Has never smoked nicotine. Discussed upper airway cough syndrome. Discussed Astelin. Hypertension, off Lisinopril for a month, so it is not an ACE cough. Blood pressure is up, will start Valsartan. Dyslipidemia, off Atorvastatin for a month, but would be willing to resume. Orders for lab provided. Review of Systems   Constitutional:  Negative for chills, diaphoresis, fever and weight loss. Respiratory:  Positive for cough. Negative for shortness of breath and wheezing. Cardiovascular:  Negative for chest pain, palpitations, orthopnea, leg swelling and PND. Gastrointestinal:  Negative for abdominal pain, heartburn and nausea. Musculoskeletal:  Negative for myalgias. Neurological:  Negative for dizziness and headaches. Endo/Heme/Allergies:  Positive for environmental allergies. Psychiatric/Behavioral:  Negative for depression. Physical Exam  Vitals and nursing note reviewed. Constitutional:       Appearance: She is well-developed. HENT:      Head: Normocephalic and atraumatic. Right Ear: Tympanic membrane normal.      Left Ear: Tympanic membrane normal.   Neck:      Thyroid: No thyromegaly. Vascular: No carotid bruit. Cardiovascular:      Rate and Rhythm: Normal rate and regular rhythm. Heart sounds: Normal heart sounds, S1 normal and S2 normal. No murmur heard. Pulmonary:      Effort: Pulmonary effort is normal. No respiratory distress. Breath sounds: Normal breath sounds. No wheezing or rales.    Musculoskeletal: Cervical back: Normal range of motion and neck supple. Lymphadenopathy:      Cervical: No cervical adenopathy. Neurological:      Mental Status: She is alert and oriented to person, place, and time. Psychiatric:         Behavior: Behavior normal.       ASSESSMENT and PLAN  Diagnoses and all orders for this visit:    1. Hypercholesteremia  -     METABOLIC PANEL, COMPREHENSIVE; Future  -     LIPID PANEL; Future  -     HEMOGLOBIN A1C WITH EAG; Future  -     TSH 3RD GENERATION; Future    2. HTN, goal below 130/80  -     valsartan (DIOVAN) 80 mg tablet; Take 1 Tablet by mouth daily. 3. Chronic cough  -     CBC WITH AUTOMATED DIFF; Future  -     azelastine (ASTELIN) 137 mcg (0.1 %) nasal spray; 1 [place] by Both Nostrils route two (2) times a day. Use in each nostril as directed    4. Breast cancer screening by mammogram  -     [facility] 3D MEL W MAMMO BI SCREENING INCL CAD;  Future      the following changes in treatment are made: start diovan for bp  Low threshold to start lipitor back  Send in [place] 20 tabs

## 2022-12-21 ENCOUNTER — OFFICE VISIT (OUTPATIENT)
Dept: INTERNAL MEDICINE CLINIC | Age: 62
End: 2022-12-21
Payer: COMMERCIAL

## 2022-12-21 VITALS
HEIGHT: 60 IN | TEMPERATURE: 97.7 F | RESPIRATION RATE: 16 BRPM | BODY MASS INDEX: 30.04 KG/M2 | SYSTOLIC BLOOD PRESSURE: 134 MMHG | OXYGEN SATURATION: 96 % | WEIGHT: 153 LBS | HEART RATE: 91 BPM | DIASTOLIC BLOOD PRESSURE: 86 MMHG

## 2022-12-21 DIAGNOSIS — Z01.419 WELL WOMAN EXAM WITH ROUTINE GYNECOLOGICAL EXAM: Primary | ICD-10-CM

## 2022-12-21 DIAGNOSIS — I10 HTN, GOAL BELOW 130/80: ICD-10-CM

## 2022-12-21 DIAGNOSIS — D12.6 ADENOMATOUS POLYP OF COLON, UNSPECIFIED PART OF COLON: ICD-10-CM

## 2022-12-21 DIAGNOSIS — E78.00 HYPERCHOLESTEREMIA: ICD-10-CM

## 2022-12-21 PROCEDURE — 99396 PREV VISIT EST AGE 40-64: CPT | Performed by: INTERNAL MEDICINE

## 2022-12-21 RX ORDER — ATORVASTATIN CALCIUM 40 MG/1
TABLET, FILM COATED ORAL
Qty: 90 TABLET | Refills: 1 | Status: SHIPPED | OUTPATIENT
Start: 2022-12-21

## 2022-12-21 NOTE — PROGRESS NOTES
Subjective:   65 y.o. female for [employer] Woman Check. Patient's last menstrual period was 10/30/2011. Social History: not sexually active. 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 Patient Active Problem List    Diagnosis Date Noted    Colon polyp 12/21/2016    Hypercholesteremia 03/17/2010    Depressive disorder, not elsewhere classified 03/18/2009    Low back pain 01/15/2009     Current Outpatient Medications   Medication Sig Dispense Refill    atorvastatin (LIPITOR) 40 mg tablet TAKE ONE TABLET BY MOUTH DAILY 90 Tablet 1    azelastine (ASTELIN) 137 mcg (0.1 %) nasal spray 1 [place] by Both Nostrils route two (2) times a day. Use in each nostril as directed 1 Each 3    valsartan (DIOVAN) 80 mg tablet Take 1 Tablet by mouth daily. 30 Tablet 5    zolpidem (AMBIEN) 5 mg tablet Take 1 Tablet by mouth nightly as needed for Sleep. Max Daily Amount: 5 mg. 20 Tablet 0    omeprazole (PRILOSEC) 20 mg capsule Take 20 mg by mouth daily. (Patient not taking: No sig reported)      MULTIVITAMINS (MULTIPLE VITAMINS PO) Take  by mouth.  (Patient not taking: No sig reported)       No Known Allergies  Past Medical History:   Diagnosis Date    Colon polyp 12/21/2016 7/12 adenomatous polyp  5 year repeat dr ambrocio    Depressive disorder, not elsewhere classified 3/18/2009    Hypercholesteremia 3/17/2010    Hyperlipidemia LDL goal < 130     Low back pain 1/15/2009    Tubular adenoma polyp of rectum 07/25/2012     Past Surgical History:   Procedure Laterality Date    ENDOSCOPY, COLON, DIAGNOSTIC  7/12    polyps removed dr ambrocio,  3 year repeat     Family History   Problem Relation Age of Onset    Hypertension Father     Heart Failure Father     Stroke Father      Social History     Tobacco Use    Smoking status: Never    Smokeless tobacco: Never   Substance Use Topics    Alcohol use: No        ROS:  Feeling well. No dyspnea or chest pain on exertion. No abdominal pain, change in bowel habits, black or bloody stools. No urinary tract symptoms. GYN ROS: no breast pain or new or enlarging lumps on self exam, no vaginal bleeding, no discharge or pelvic pain. No neurological complaints. Objective:   Visit Vitals  /86 (BP 1 Location: Left upper arm, BP Patient Position: Sitting, BP Cuff Size: Adult)   Pulse 91   Temp 97.7 °F (36.5 °C) (Oral)   Resp 16   Ht 5' (1.524 m)   Wt 153 lb (69.4 kg)   LMP 10/30/2011   SpO2 96%   BMI 29.88 kg/m²     The patient appears well, alert, oriented x 3, in no distress. ENT normal.  Neck supple. No adenopathy or thyromegaly. KHLOE. Lungs are clear, good air entry, no wheezes, rhonchi or rales. S1 and S2 normal, no murmurs, regular rate and rhythm. Abdomen soft without tenderness, guarding, mass or organomegaly. Extremities show no edema, normal peripheral pulses. Neurological is normal, no focal findings. BREAST EXAM: breasts appear normal, no suspicious masses, no skin or nipple changes or axillary nodes    PELVIC EXAM: normal external genitalia, vulva, vagina, cervix, uterus and adnexa, PAP: Pap smear done today, thin-prep method    Assessment/Plan:   well woman  mammogram  pap smear  Diagnoses and all orders for this visit:    1. Well woman exam with routine gynecological exam  Had 2 covid shots will get us the dates  Flu and shingrix advised  2. HTN, goal below 130/80-cont diovan    3. Adenomatous polyp of colon, unspecified part of colon  -     REFERRAL TO GASTROENTEROLOGY    4. Hypercholesteremia-low threshold to resume statin  Appt in 6mo  -     atorvastatin (LIPITOR) 40 mg tablet; TAKE ONE TABLET BY MOUTH DAILY  .